# Patient Record
Sex: MALE | Race: WHITE
[De-identification: names, ages, dates, MRNs, and addresses within clinical notes are randomized per-mention and may not be internally consistent; named-entity substitution may affect disease eponyms.]

---

## 2019-11-12 ENCOUNTER — HOSPITAL ENCOUNTER (EMERGENCY)
Dept: HOSPITAL 94 - ER | Age: 39
Discharge: HOME | End: 2019-11-12
Payer: MEDICAID

## 2019-11-12 VITALS — WEIGHT: 167.55 LBS | HEIGHT: 74 IN | BODY MASS INDEX: 21.5 KG/M2

## 2019-11-12 VITALS — DIASTOLIC BLOOD PRESSURE: 97 MMHG | SYSTOLIC BLOOD PRESSURE: 158 MMHG

## 2019-11-12 DIAGNOSIS — H57.12: Primary | ICD-10-CM

## 2019-11-12 DIAGNOSIS — Z79.899: ICD-10-CM

## 2019-11-12 PROCEDURE — 99283 EMERGENCY DEPT VISIT LOW MDM: CPT

## 2020-07-16 ENCOUNTER — HOSPITAL ENCOUNTER (INPATIENT)
Dept: HOSPITAL 94 - ER | Age: 40
LOS: 19 days | DRG: 720 | End: 2020-08-04
Attending: INTERNAL MEDICINE | Admitting: INTERNAL MEDICINE
Payer: MEDICAID

## 2020-07-16 VITALS — SYSTOLIC BLOOD PRESSURE: 115 MMHG | DIASTOLIC BLOOD PRESSURE: 65 MMHG

## 2020-07-16 VITALS — DIASTOLIC BLOOD PRESSURE: 80 MMHG | SYSTOLIC BLOOD PRESSURE: 147 MMHG

## 2020-07-16 VITALS — BODY MASS INDEX: 26.09 KG/M2 | HEIGHT: 73 IN | WEIGHT: 196.87 LBS

## 2020-07-16 VITALS — SYSTOLIC BLOOD PRESSURE: 102 MMHG | DIASTOLIC BLOOD PRESSURE: 48 MMHG

## 2020-07-16 DIAGNOSIS — A41.9: Primary | ICD-10-CM

## 2020-07-16 DIAGNOSIS — R64: ICD-10-CM

## 2020-07-16 DIAGNOSIS — E87.2: ICD-10-CM

## 2020-07-16 DIAGNOSIS — K29.80: ICD-10-CM

## 2020-07-16 DIAGNOSIS — F15.129: ICD-10-CM

## 2020-07-16 DIAGNOSIS — Z20.828: ICD-10-CM

## 2020-07-16 DIAGNOSIS — Z66: ICD-10-CM

## 2020-07-16 DIAGNOSIS — T88.4XXA: ICD-10-CM
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
## 2020-07-16 DIAGNOSIS — R57.0: ICD-10-CM

## 2020-07-16 DIAGNOSIS — K55.069: ICD-10-CM

## 2020-07-16 DIAGNOSIS — K29.70: ICD-10-CM

## 2020-07-16 DIAGNOSIS — M62.82: ICD-10-CM

## 2020-07-16 DIAGNOSIS — R65.21: ICD-10-CM

## 2020-07-16 DIAGNOSIS — G93.40: ICD-10-CM

## 2020-07-16 DIAGNOSIS — E86.0: ICD-10-CM

## 2020-07-16 DIAGNOSIS — E87.5: ICD-10-CM

## 2020-07-16 DIAGNOSIS — J96.01: ICD-10-CM

## 2020-07-16 DIAGNOSIS — N17.9: ICD-10-CM

## 2020-07-16 DIAGNOSIS — I21.4: ICD-10-CM

## 2020-07-16 LAB
ALBUMIN SERPL BCP-MCNC: 3.3 G/DL (ref 3.4–5)
ALBUMIN SERPL BCP-MCNC: 4.3 G/DL (ref 3.4–5)
ALBUMIN/GLOB SERPL: 1 {RATIO} (ref 1.1–1.5)
ALP SERPL-CCNC: 112 IU/L (ref 46–116)
ALT SERPL W P-5'-P-CCNC: 22 U/L (ref 12–78)
AMORPH URATE CRY #/AREA URNS HPF: (no result) /[HPF]
AMPHETAMINES UR QL SCN: POSITIVE
AMYLASE SERPL-CCNC: 407 U/L (ref 25–115)
ANION GAP SERPL CALCULATED.3IONS-SCNC: 13 MMOL/L (ref 8–16)
ANION GAP SERPL CALCULATED.3IONS-SCNC: 13 MMOL/L (ref 8–16)
APTT PPP: 40 SECONDS (ref 22–32)
ARTERIAL PATENCY WRIST A: POSITIVE
AST SERPL W P-5'-P-CCNC: 56 U/L (ref 10–37)
BACTERIA URNS QL MICRO: (no result) /HPF
BARBITURATES UR QL SCN: NEGATIVE
BASE EXCESS BLDA CALC-SCNC: -9 MMOL/L (ref -2–2)
BASOPHILS # BLD AUTO: 0 X10'3 (ref 0–0.2)
BASOPHILS # BLD AUTO: 0 X10'3 (ref 0–0.2)
BASOPHILS NFR BLD AUTO: 0.1 % (ref 0–1)
BASOPHILS NFR BLD AUTO: 0.5 % (ref 0–1)
BENZODIAZ UR QL SCN: NEGATIVE
BILIRUB SERPL-MCNC: 0.3 MG/DL (ref 0.1–1)
BODY TEMPERATURE: 41.2
BUN SERPL-MCNC: 17 MG/DL (ref 7–18)
BUN SERPL-MCNC: 28 MG/DL (ref 7–18)
BUN/CREAT SERPL: 8.6 (ref 5.4–32)
BUN/CREAT SERPL: 9.7 (ref 5.4–32)
BZE UR QL SCN: NEGATIVE
CALCIUM SERPL-MCNC: 7.7 MG/DL (ref 8.5–10.1)
CALCIUM SERPL-MCNC: 9.4 MG/DL (ref 8.5–10.1)
CANNABINOIDS UR QL SCN: POSITIVE
CHLORIDE SERPL-SCNC: 107 MMOL/L (ref 99–107)
CHLORIDE SERPL-SCNC: 109 MMOL/L (ref 99–107)
CK SERPL-CCNC: 6927 U/L (ref 39–308)
CLARITY UR: (no result)
CO2 SERPL-SCNC: 19.4 MMOL/L (ref 24–32)
CO2 SERPL-SCNC: 22.6 MMOL/L (ref 24–32)
COHGB MFR BLDA: 0.5 % (ref 0–3.9)
COLOR UR: YELLOW
CREAT SERPL-MCNC: 1.98 MG/DL (ref 0.6–1.1)
CREAT SERPL-MCNC: 2.89 MG/DL (ref 0.6–1.1)
DEPRECATED SQUAMOUS URNS QL MICRO: (no result) /LPF
EOSINOPHIL # BLD AUTO: 0 X10'3 (ref 0–0.9)
EOSINOPHIL # BLD AUTO: 0.1 X10'3 (ref 0–0.9)
EOSINOPHIL NFR BLD AUTO: 0.1 % (ref 0–6)
EOSINOPHIL NFR BLD AUTO: 1.1 % (ref 0–6)
EOSINOPHIL NFR BLD MANUAL: 1 % (ref 0–6)
ERYTHROCYTE [DISTWIDTH] IN BLOOD BY AUTOMATED COUNT: 13.7 % (ref 11.5–14.5)
ERYTHROCYTE [DISTWIDTH] IN BLOOD BY AUTOMATED COUNT: 14 % (ref 11.5–14.5)
ETHANOL SERPL-MCNC: < 0.01 GM/DL (ref 0–0.01)
GFR SERPL CREATININE-BSD FRML MDRD: 24 ML/MIN
GFR SERPL CREATININE-BSD FRML MDRD: 38 ML/MIN
GLUCOSE SERPL-MCNC: 121 MG/DL (ref 70–104)
GLUCOSE SERPL-MCNC: 80 MG/DL (ref 70–104)
GLUCOSE UR STRIP-MCNC: NEGATIVE MG/DL
HCO3 BLDA-SCNC: 17.4 MMOL/L (ref 22–26)
HCT VFR BLD AUTO: 44.3 % (ref 42–52)
HCT VFR BLD AUTO: 47.4 % (ref 42–52)
HGB BLD-MCNC: 14.4 G/DL (ref 14–17.9)
HGB BLD-MCNC: 16.3 G/DL (ref 14–17.9)
HGB BLDA-MCNC: 14.8 G/DL (ref 14–18)
HGB UR QL STRIP: NEGATIVE
KETONES UR STRIP-MCNC: NEGATIVE MG/DL
LDLC SERPL DIRECT ASSAY-MCNC: 134 MG/DL (ref 50–100)
LEUKOCYTE ESTERASE UR QL STRIP: NEGATIVE
LIPASE SERPL-CCNC: 6482 U/L (ref 73–393)
LYMPHOCYTES # BLD AUTO: 0.9 X10'3 (ref 1.1–4.8)
LYMPHOCYTES # BLD AUTO: 2.9 X10'3 (ref 1.1–4.8)
LYMPHOCYTES NFR BLD AUTO: 58 % (ref 21–51)
LYMPHOCYTES NFR BLD AUTO: 6 % (ref 21–51)
LYMPHOCYTES NFR BLD MANUAL: 51 % (ref 21–51)
MCH RBC QN AUTO: 30.3 PG (ref 27–31)
MCH RBC QN AUTO: 31.6 PG (ref 27–31)
MCHC RBC AUTO-ENTMCNC: 32.6 G/DL (ref 33–36.5)
MCHC RBC AUTO-ENTMCNC: 34.3 G/DL (ref 33–36.5)
MCV RBC AUTO: 91.9 FL (ref 78–98)
MCV RBC AUTO: 92.9 FL (ref 78–98)
METAMYELOCYTES NFR BLD MANUAL: 1 % (ref 0–0)
METHADONE UR QL SCN: NEGATIVE
METHGB MFR BLDA: 0.4 % (ref 0–1.5)
MONOCYTES # BLD AUTO: 0.1 X10'3 (ref 0–0.9)
MONOCYTES # BLD AUTO: 1.2 X10'3 (ref 0–0.9)
MONOCYTES NFR BLD AUTO: 2.3 % (ref 2–12)
MONOCYTES NFR BLD AUTO: 8.4 % (ref 2–12)
MONOCYTES NFR BLD MANUAL: 7 % (ref 2–12)
MUCOUS THREADS URNS QL MICRO: (no result) /LPF
NEUTROPHILS # BLD AUTO: 1.9 X10'3 (ref 1.8–7.7)
NEUTROPHILS # BLD AUTO: 12.5 X10'3 (ref 1.8–7.7)
NEUTROPHILS NFR BLD AUTO: 38.1 % (ref 42–75)
NEUTROPHILS NFR BLD AUTO: 85.4 % (ref 42–75)
NEUTS HYPERSEG BLD QL SMEAR: (no result)
NEUTS SEG NFR BLD MANUAL: 34 % (ref 42–75)
NITRITE UR QL STRIP: NEGATIVE
O2/TOTAL GAS SETTING VFR VENT: 100 MMHG/%
OPIATES UR QL SCN: NEGATIVE
OXYHGB MFR BLDA: 98.7 % (ref 94–97)
PCO2 TEMP ADJ BLDA: 47.6 MMHG (ref 35–48)
PCP UR QL SCN: NEGATIVE
PEEP RESPIRATORY: 5 CM H2O
PH UR STRIP: 6.5 [PH] (ref 4.8–8)
PLATELET # BLD AUTO: 154 X10'3 (ref 140–440)
PLATELET # BLD AUTO: 304 X10'3 (ref 140–440)
PLATELET BLD QL SMEAR: NORMAL
PMV BLD AUTO: 7.2 FL (ref 7.4–10.4)
PMV BLD AUTO: 7.3 FL (ref 7.4–10.4)
PO2 TEMP ADJ BLD: 528 MMHG (ref 75–100)
POTASSIUM SERPL-SCNC: 4.3 MMOL/L (ref 3.5–5.1)
POTASSIUM SERPL-SCNC: 5.9 MMOL/L (ref 3.5–5.1)
PROT SERPL-MCNC: 8.7 G/DL (ref 6.4–8.2)
PROT UR QL STRIP: 30 MG/DL
RBC # BLD AUTO: 4.77 X10'6 (ref 4.7–6.1)
RBC # BLD AUTO: 5.16 X10'6 (ref 4.7–6.1)
RBC #/AREA URNS HPF: (no result) /HPF (ref 0–2)
RBC MORPH BLD: NORMAL
RESP RATE 1H: 18 B/MIN
SAO2 % BLDA: 99.6 % (ref 94–97)
SMUDGE CELLS BLD QL SMEAR: (no result)
SODIUM SERPL-SCNC: 141 MMOL/L (ref 135–145)
SODIUM SERPL-SCNC: 143 MMOL/L (ref 135–145)
SP GR UR STRIP: >=1.03 (ref 1–1.03)
SPONT VT COMPRES GAS VOL SET UNCOR VENT: 450 ML
TOTAL CELLS COUNTED FLD: 100
TRANS CELLS URNS QL MICRO: (no result) /HPF
TROPONIN I SERPL-MCNC: 0.31 NG/ML (ref 0–0.05)
URN COLLECT METHOD CLASS: (no result)
UROBILINOGEN UR STRIP-MCNC: 0.2 E.U/DL (ref 0.2–1)
VARIANT LYMPHS NFR BLD MANUAL: 6 % (ref 0–0)
WBC # BLD AUTO: 14.7 X10'3 (ref 4.5–11)
WBC # BLD AUTO: 5 X10'3 (ref 4.5–11)
WBC #/AREA URNS HPF: (no result) /HPF (ref 0–4)
WBC CLUMPS #/AREA URNS HPF: (no result) /HPF

## 2020-07-16 PROCEDURE — 70450 CT HEAD/BRAIN W/O DYE: CPT

## 2020-07-16 PROCEDURE — 87635 SARS-COV-2 COVID-19 AMP PRB: CPT

## 2020-07-16 PROCEDURE — 94760 N-INVAS EAR/PLS OXIMETRY 1: CPT

## 2020-07-16 PROCEDURE — 85025 COMPLETE CBC W/AUTO DIFF WBC: CPT

## 2020-07-16 PROCEDURE — 71250 CT THORAX DX C-: CPT

## 2020-07-16 PROCEDURE — 80305 DRUG TEST PRSMV DIR OPT OBS: CPT

## 2020-07-16 PROCEDURE — 85379 FIBRIN DEGRADATION QUANT: CPT

## 2020-07-16 PROCEDURE — 74018 RADEX ABDOMEN 1 VIEW: CPT

## 2020-07-16 PROCEDURE — 87186 SC STD MICRODIL/AGAR DIL: CPT

## 2020-07-16 PROCEDURE — 82150 ASSAY OF AMYLASE: CPT

## 2020-07-16 PROCEDURE — 86920 COMPATIBILITY TEST SPIN: CPT

## 2020-07-16 PROCEDURE — 82810 BLOOD GASES O2 SAT ONLY: CPT

## 2020-07-16 PROCEDURE — 95816 EEG AWAKE AND DROWSY: CPT

## 2020-07-16 PROCEDURE — 85018 HEMOGLOBIN: CPT

## 2020-07-16 PROCEDURE — 87070 CULTURE OTHR SPECIMN AEROBIC: CPT

## 2020-07-16 PROCEDURE — 77001 FLUOROGUIDE FOR VEIN DEVICE: CPT

## 2020-07-16 PROCEDURE — 82140 ASSAY OF AMMONIA: CPT

## 2020-07-16 PROCEDURE — 99291 CRITICAL CARE FIRST HOUR: CPT

## 2020-07-16 PROCEDURE — 82550 ASSAY OF CK (CPK): CPT

## 2020-07-16 PROCEDURE — 87077 CULTURE AEROBIC IDENTIFY: CPT

## 2020-07-16 PROCEDURE — 36600 WITHDRAWAL OF ARTERIAL BLOOD: CPT

## 2020-07-16 PROCEDURE — 87088 URINE BACTERIA CULTURE: CPT

## 2020-07-16 PROCEDURE — 84100 ASSAY OF PHOSPHORUS: CPT

## 2020-07-16 PROCEDURE — 83721 ASSAY OF BLOOD LIPOPROTEIN: CPT

## 2020-07-16 PROCEDURE — 83690 ASSAY OF LIPASE: CPT

## 2020-07-16 PROCEDURE — 93005 ELECTROCARDIOGRAM TRACING: CPT

## 2020-07-16 PROCEDURE — 36558 INSERT TUNNELED CV CATH: CPT

## 2020-07-16 PROCEDURE — 80048 BASIC METABOLIC PNL TOTAL CA: CPT

## 2020-07-16 PROCEDURE — 36430 TRANSFUSION BLD/BLD COMPNT: CPT

## 2020-07-16 PROCEDURE — 0BH17EZ INSERTION OF ENDOTRACHEAL AIRWAY INTO TRACHEA, VIA NATURAL OR ARTIFICIAL OPENING: ICD-10-PCS | Performed by: INTERNAL MEDICINE

## 2020-07-16 PROCEDURE — 81001 URINALYSIS AUTO W/SCOPE: CPT

## 2020-07-16 PROCEDURE — 87081 CULTURE SCREEN ONLY: CPT

## 2020-07-16 PROCEDURE — 76937 US GUIDE VASCULAR ACCESS: CPT

## 2020-07-16 PROCEDURE — 94002 VENT MGMT INPAT INIT DAY: CPT

## 2020-07-16 PROCEDURE — 70551 MRI BRAIN STEM W/O DYE: CPT

## 2020-07-16 PROCEDURE — 86900 BLOOD TYPING SEROLOGIC ABO: CPT

## 2020-07-16 PROCEDURE — 82948 REAGENT STRIP/BLOOD GLUCOSE: CPT

## 2020-07-16 PROCEDURE — 83605 ASSAY OF LACTIC ACID: CPT

## 2020-07-16 PROCEDURE — 5A1955Z RESPIRATORY VENTILATION, GREATER THAN 96 CONSECUTIVE HOURS: ICD-10-PCS | Performed by: INTERNAL MEDICINE

## 2020-07-16 PROCEDURE — 83874 ASSAY OF MYOGLOBIN: CPT

## 2020-07-16 PROCEDURE — 85027 COMPLETE CBC AUTOMATED: CPT

## 2020-07-16 PROCEDURE — 84134 ASSAY OF PREALBUMIN: CPT

## 2020-07-16 PROCEDURE — 85730 THROMBOPLASTIN TIME PARTIAL: CPT

## 2020-07-16 PROCEDURE — 80053 COMPREHEN METABOLIC PANEL: CPT

## 2020-07-16 PROCEDURE — 84484 ASSAY OF TROPONIN QUANT: CPT

## 2020-07-16 PROCEDURE — 36415 COLL VENOUS BLD VENIPUNCTURE: CPT

## 2020-07-16 PROCEDURE — 74176 CT ABD & PELVIS W/O CONTRAST: CPT

## 2020-07-16 PROCEDURE — 85610 PROTHROMBIN TIME: CPT

## 2020-07-16 PROCEDURE — 99292 CRITICAL CARE ADDL 30 MIN: CPT

## 2020-07-16 PROCEDURE — 85384 FIBRINOGEN ACTIVITY: CPT

## 2020-07-16 PROCEDURE — 86885 COOMBS TEST INDIRECT QUAL: CPT

## 2020-07-16 PROCEDURE — 87340 HEPATITIS B SURFACE AG IA: CPT

## 2020-07-16 PROCEDURE — 84145 PROCALCITONIN (PCT): CPT

## 2020-07-16 PROCEDURE — 82803 BLOOD GASES ANY COMBINATION: CPT

## 2020-07-16 PROCEDURE — 94003 VENT MGMT INPAT SUBQ DAY: CPT

## 2020-07-16 PROCEDURE — 80320 DRUG SCREEN QUANTALCOHOLS: CPT

## 2020-07-16 PROCEDURE — 43235 EGD DIAGNOSTIC BRUSH WASH: CPT

## 2020-07-16 PROCEDURE — 92508 TX SP LANG VOICE COMM GROUP: CPT

## 2020-07-16 PROCEDURE — 80202 ASSAY OF VANCOMYCIN: CPT

## 2020-07-16 PROCEDURE — 94640 AIRWAY INHALATION TREATMENT: CPT

## 2020-07-16 PROCEDURE — 83735 ASSAY OF MAGNESIUM: CPT

## 2020-07-16 PROCEDURE — 84443 ASSAY THYROID STIM HORMONE: CPT

## 2020-07-16 PROCEDURE — 87040 BLOOD CULTURE FOR BACTERIA: CPT

## 2020-07-16 PROCEDURE — 93306 TTE W/DOPPLER COMPLETE: CPT

## 2020-07-16 PROCEDURE — 71045 X-RAY EXAM CHEST 1 VIEW: CPT

## 2020-07-16 PROCEDURE — 86901 BLOOD TYPING SEROLOGIC RH(D): CPT

## 2020-07-16 RX ADMIN — HEPARIN SODIUM SCH UNIT: 5000 INJECTION, SOLUTION INTRAVENOUS; SUBCUTANEOUS at 20:00

## 2020-07-16 RX ADMIN — VANCOMYCIN HYDROCHLORIDE SCH MLS/HR: 750 INJECTION, POWDER, LYOPHILIZED, FOR SOLUTION INTRAVENOUS at 20:31

## 2020-07-16 RX ADMIN — Medication PRN MLS/HR: at 18:33

## 2020-07-16 RX ADMIN — Medication PRN MLS/HR: at 18:37

## 2020-07-16 RX ADMIN — Medication SCH MLS/HR: at 17:52

## 2020-07-16 NOTE — NUR
[place] @ 2045 [place] and transferred to ICU bed and placed on bedside 
monitor. PT is afebrile with temp of 36.8 after being cooled in ED and given Dantrolene. PT 
has R-IJ that is oozing, pressure drsg placed. Sanders in place draining magaly/zuleima colored 
urine to gravity. PT intubated and tolerating settings, O2 sat > 97%. PT has abrasion to LT 
shoulder and hip as well as a contusion to LT lateral chest, Bilat breath sounds auscultated 
and no crepitus noted. PT also has a spot on his front upper gums that appears to be 
bleeding.  Bed is locked and low. Bilat soft wrist restraints in place ans secure. Will 
continue to monitor.

## 2020-07-16 NOTE — NUR
KAYEXELATE ORDERED PO, PT OG TUBE IS CONTINUALLY SUCTIONING SEROSANGINOUS 
FLUID. ANA AWARE AND ORDERED KAYEXELATE RECTALLY

## 2020-07-16 NOTE — NUR
NOTED GASTRIC CONTENTS THROUGH NGT IN SUCTION CANISTER @150ML OF RED WINE COLOR 
RETURN. MD NOTIFIED. ORDERS TO COLD SALINE LAVAGE THROUGH NGT.

## 2020-07-16 NOTE — NUR
Pt transported to CT scan via gurney with the primary nurse, respiratory 
therapist and Radiology transport tech.

## 2020-07-16 NOTE — NUR
I have received report from Ruthie MAGAÑA from ED and had the opportunity to ask questions. Room 
is set up and ready for PT arrival.

## 2020-07-16 NOTE — NUR
PT AROUSED AND HEAVING AND BLOODY FLUIDS IN MOUTH. PT MOUTH SUCTIONED AND NG 
TUBE PLACED ON LOW INTERMITTENT SUCTION. 75 ML OF BRIGHT RED FLUIDS OUT OF NG.

## 2020-07-16 NOTE — NUR
1656-20 ETOMADATE, 1700-100 ROCC, 8.0 ETT, 26 AT THE TEETH. VENT SETTINGS TV 
450, 100%, RATE 18, PEEP 5. LEVOPHED STARTED AT 1715 AT 14.51ML/HR

## 2020-07-17 VITALS — DIASTOLIC BLOOD PRESSURE: 54 MMHG | SYSTOLIC BLOOD PRESSURE: 92 MMHG

## 2020-07-17 VITALS — SYSTOLIC BLOOD PRESSURE: 99 MMHG | DIASTOLIC BLOOD PRESSURE: 63 MMHG

## 2020-07-17 VITALS — SYSTOLIC BLOOD PRESSURE: 86 MMHG | DIASTOLIC BLOOD PRESSURE: 45 MMHG

## 2020-07-17 VITALS — DIASTOLIC BLOOD PRESSURE: 57 MMHG | SYSTOLIC BLOOD PRESSURE: 92 MMHG

## 2020-07-17 VITALS — DIASTOLIC BLOOD PRESSURE: 67 MMHG | SYSTOLIC BLOOD PRESSURE: 101 MMHG

## 2020-07-17 VITALS — DIASTOLIC BLOOD PRESSURE: 47 MMHG | SYSTOLIC BLOOD PRESSURE: 88 MMHG

## 2020-07-17 VITALS — SYSTOLIC BLOOD PRESSURE: 86 MMHG | DIASTOLIC BLOOD PRESSURE: 49 MMHG

## 2020-07-17 VITALS — DIASTOLIC BLOOD PRESSURE: 64 MMHG | SYSTOLIC BLOOD PRESSURE: 102 MMHG

## 2020-07-17 VITALS — DIASTOLIC BLOOD PRESSURE: 55 MMHG | SYSTOLIC BLOOD PRESSURE: 87 MMHG

## 2020-07-17 VITALS — SYSTOLIC BLOOD PRESSURE: 93 MMHG | DIASTOLIC BLOOD PRESSURE: 58 MMHG

## 2020-07-17 VITALS — DIASTOLIC BLOOD PRESSURE: 65 MMHG | SYSTOLIC BLOOD PRESSURE: 105 MMHG

## 2020-07-17 VITALS — SYSTOLIC BLOOD PRESSURE: 98 MMHG | DIASTOLIC BLOOD PRESSURE: 59 MMHG

## 2020-07-17 VITALS — DIASTOLIC BLOOD PRESSURE: 58 MMHG | SYSTOLIC BLOOD PRESSURE: 109 MMHG

## 2020-07-17 VITALS — SYSTOLIC BLOOD PRESSURE: 98 MMHG | DIASTOLIC BLOOD PRESSURE: 55 MMHG

## 2020-07-17 VITALS — DIASTOLIC BLOOD PRESSURE: 56 MMHG | SYSTOLIC BLOOD PRESSURE: 94 MMHG

## 2020-07-17 VITALS — SYSTOLIC BLOOD PRESSURE: 81 MMHG | DIASTOLIC BLOOD PRESSURE: 51 MMHG

## 2020-07-17 VITALS — SYSTOLIC BLOOD PRESSURE: 85 MMHG | DIASTOLIC BLOOD PRESSURE: 56 MMHG

## 2020-07-17 VITALS — SYSTOLIC BLOOD PRESSURE: 104 MMHG | DIASTOLIC BLOOD PRESSURE: 63 MMHG

## 2020-07-17 VITALS — SYSTOLIC BLOOD PRESSURE: 80 MMHG | DIASTOLIC BLOOD PRESSURE: 47 MMHG

## 2020-07-17 VITALS — SYSTOLIC BLOOD PRESSURE: 94 MMHG | DIASTOLIC BLOOD PRESSURE: 53 MMHG

## 2020-07-17 VITALS — DIASTOLIC BLOOD PRESSURE: 64 MMHG | SYSTOLIC BLOOD PRESSURE: 101 MMHG

## 2020-07-17 VITALS — DIASTOLIC BLOOD PRESSURE: 61 MMHG | SYSTOLIC BLOOD PRESSURE: 103 MMHG

## 2020-07-17 VITALS — DIASTOLIC BLOOD PRESSURE: 65 MMHG | SYSTOLIC BLOOD PRESSURE: 102 MMHG

## 2020-07-17 LAB
ALBUMIN SERPL BCP-MCNC: 3.1 G/DL (ref 3.4–5)
ALBUMIN/GLOB SERPL: 1 {RATIO} (ref 1.1–1.5)
ALP SERPL-CCNC: 118 IU/L (ref 46–116)
ALT SERPL W P-5'-P-CCNC: 168 U/L (ref 12–78)
AMYLASE SERPL-CCNC: 638 U/L (ref 25–115)
ANION GAP SERPL CALCULATED.3IONS-SCNC: 13 MMOL/L (ref 8–16)
APTT PPP: 87 SECONDS (ref 22–32)
APTT PPP: 89 SECONDS (ref 22–32)
ARTERIAL PATENCY WRIST A: POSITIVE
AST SERPL W P-5'-P-CCNC: 803 U/L (ref 10–37)
BASE EXCESS BLDA CALC-SCNC: -11.4 MMOL/L (ref -2–2)
BASOPHILS # BLD AUTO: 0 X10'3 (ref 0–0.2)
BASOPHILS NFR BLD AUTO: 0.1 % (ref 0–1)
BILIRUB SERPL-MCNC: 0.5 MG/DL (ref 0.1–1)
BODY TEMPERATURE: 37.7
BUN SERPL-MCNC: 34 MG/DL (ref 7–18)
BUN/CREAT SERPL: 9.8 (ref 5.4–32)
CALCIUM SERPL-MCNC: 8.1 MG/DL (ref 8.5–10.1)
CHLORIDE SERPL-SCNC: 108 MMOL/L (ref 99–107)
CK SERPL-CCNC: (no result) U/L (ref 39–308)
CO2 SERPL-SCNC: 20.2 MMOL/L (ref 24–32)
COHGB MFR BLDA: 0.3 % (ref 0–3.9)
CREAT SERPL-MCNC: 3.46 MG/DL (ref 0.6–1.1)
EOSINOPHIL # BLD AUTO: 0 X10'3 (ref 0–0.9)
EOSINOPHIL NFR BLD AUTO: 0.1 % (ref 0–6)
ERYTHROCYTE [DISTWIDTH] IN BLOOD BY AUTOMATED COUNT: 13.9 % (ref 11.5–14.5)
GFR SERPL CREATININE-BSD FRML MDRD: 20 ML/MIN
GLUCOSE SERPL-MCNC: 107 MG/DL (ref 70–104)
HCO3 BLDA-SCNC: 14.2 MMOL/L (ref 22–26)
HCT VFR BLD AUTO: 44.1 % (ref 42–52)
HGB BLD-MCNC: 14.5 G/DL (ref 14–17.9)
HGB BLDA-MCNC: 15.1 G/DL (ref 14–18)
LDLC SERPL DIRECT ASSAY-MCNC: 116 MG/DL (ref 50–100)
LIPASE SERPL-CCNC: 6036 U/L (ref 73–393)
LYMPHOCYTES # BLD AUTO: 0.4 X10'3 (ref 1.1–4.8)
LYMPHOCYTES NFR BLD AUTO: 4 % (ref 21–51)
MAGNESIUM SERPL-MCNC: 2 MG/DL (ref 1.5–2.4)
MAGNESIUM SERPL-MCNC: 2.3 MG/DL (ref 1.5–2.4)
MCH RBC QN AUTO: 30.9 PG (ref 27–31)
MCHC RBC AUTO-ENTMCNC: 32.9 G/DL (ref 33–36.5)
MCV RBC AUTO: 93.9 FL (ref 78–98)
METHGB MFR BLDA: 0.2 % (ref 0–1.5)
MONOCYTES # BLD AUTO: 0.8 X10'3 (ref 0–0.9)
MONOCYTES NFR BLD AUTO: 6.9 % (ref 2–12)
NEUTROPHILS # BLD AUTO: 9.8 X10'3 (ref 1.8–7.7)
NEUTROPHILS NFR BLD AUTO: 88.9 % (ref 42–75)
O2/TOTAL GAS SETTING VFR VENT: 35 MMHG/%
OXYHGB MFR BLDA: 98.2 % (ref 94–97)
PCO2 TEMP ADJ BLDA: 32.8 MMHG (ref 35–48)
PEEP RESPIRATORY: 5 CM H2O
PHOSPHATE SERPL-MCNC: 0.9 MG/DL (ref 2.3–4.5)
PHOSPHATE SERPL-MCNC: 1.6 MG/DL (ref 2.3–4.5)
PLATELET # BLD AUTO: 136 X10'3 (ref 140–440)
PMV BLD AUTO: 7 FL (ref 7.4–10.4)
PO2 TEMP ADJ BLD: 147.9 MMHG (ref 75–100)
PO2 TEMP ADJ BLDMV: 45.8 MMHG (ref 35–46)
POTASSIUM SERPL-SCNC: 3.6 MMOL/L (ref 3.5–5.1)
PROT SERPL-MCNC: 6.1 G/DL (ref 6.4–8.2)
RBC # BLD AUTO: 4.7 X10'6 (ref 4.7–6.1)
RESP RATE 1H: 18 B/MIN
SAO2 % BLDA: 98.7 % (ref 94–97)
SAO2 % BLDMV: 78.1 % (ref 60–80)
SODIUM SERPL-SCNC: 141 MMOL/L (ref 135–145)
SPONT VT COMPRES GAS VOL SET UNCOR VENT: 450 ML
TROPONIN I SERPL-MCNC: 9.48 NG/ML (ref 0–0.05)
WBC # BLD AUTO: 11 X10'3 (ref 4.5–11)

## 2020-07-17 RX ADMIN — ACETAMINOPHEN PRN MG: 160 SUSPENSION ORAL at 10:38

## 2020-07-17 RX ADMIN — Medication PRN MLS/HR: at 05:57

## 2020-07-17 RX ADMIN — HEPARIN SODIUM SCH UNIT: 5000 INJECTION, SOLUTION INTRAVENOUS; SUBCUTANEOUS at 09:17

## 2020-07-17 RX ADMIN — DEXTROSE MONOHYDRATE PRN ML: 25 INJECTION, SOLUTION INTRAVENOUS at 20:30

## 2020-07-17 RX ADMIN — DEXTROSE SCH MLS/HR: 5 SOLUTION INTRAVENOUS at 17:33

## 2020-07-17 RX ADMIN — Medication PRN MLS/HR: at 09:36

## 2020-07-17 RX ADMIN — Medication SCH MLS/HR: at 17:33

## 2020-07-17 RX ADMIN — DEXTROSE MONOHYDRATE PRN ML: 25 INJECTION, SOLUTION INTRAVENOUS at 22:16

## 2020-07-17 RX ADMIN — DEXTROSE SCH MLS/HR: 5 SOLUTION INTRAVENOUS at 08:58

## 2020-07-17 RX ADMIN — Medication PRN MLS/HR: at 20:02

## 2020-07-17 RX ADMIN — TAZOBACTAM SODIUM AND PIPERACILLIN SODIUM SCH MLS/HR: 375; 3 INJECTION, SOLUTION INTRAVENOUS at 15:43

## 2020-07-17 RX ADMIN — TAZOBACTAM SODIUM AND PIPERACILLIN SODIUM SCH MLS/HR: 375; 3 INJECTION, SOLUTION INTRAVENOUS at 00:54

## 2020-07-17 RX ADMIN — TAZOBACTAM SODIUM AND PIPERACILLIN SODIUM SCH MLS/HR: 375; 3 INJECTION, SOLUTION INTRAVENOUS at 08:58

## 2020-07-17 RX ADMIN — Medication SCH MLS/HR: at 04:56

## 2020-07-17 RX ADMIN — VANCOMYCIN HYDROCHLORIDE SCH MLS/HR: 750 INJECTION, POWDER, LYOPHILIZED, FOR SOLUTION INTRAVENOUS at 07:50

## 2020-07-17 NOTE — NUR
I have reviewed and agree with all medications administered and interventions performed by 
[facility] Julio C Hassan

-------------------------------------------------------------------------------

Addendum: 07/17/20 at 1249 by Katie Carranza RT

-------------------------------------------------------------------------------

Amended: Links added.

## 2020-07-17 NOTE — NUR
Tube feeding consult received. Pt intubated, sedated, on pressors. 

Admitted with acute respiratory failure, sepsis, KG all per MD note. MD 

discussed at critical care rounds that due to elevated pancreatic enzymes 

(lipase 6036 and amylase 638) will not start feedings today. Recs for tube 

feedings available however will hold for now. MURIEL discussed with MD to 

re-evaluate tomorrow since patient intubated, needing some type of 

nutrition tomorrow whether it be tube feeding or IV nutrition with TPN. 

Recommendations for both available in RD note. Pt MD note pt appears 

cachetic looking. 

Recommend:

1. IF tube feeding, recommend vital AF at 80 ml/hr will provide 1912 ml 

volume, 2304 cals, 144 gram protein, 1557 ml water. IF TF prealbumin 

monday and thursday.



2. IF TPN if unable to provide tube feeding would rec to meet patients 

needs 3:1 clinimix E 5/20 with 100 ml 20% intralipids at 90 ml/hr would 

provide 2160 ml total volume, 2015 total cals, 1604 non protein cals, 3.69 

mg/kg/min CHO loading. IF TPN, prealbumin and TG q monday and thursday. 



3. Daily weights. 

-------------------------------------------------------------------------------

Addendum: 07/17/20 at 1315 by Sherita Shirley RD

-------------------------------------------------------------------------------

Amended: Links added.

## 2020-07-17 NOTE — NUR
RICKI Chavez NP called re: Blood Glucose 69mg/dl via draw from central line. Orders for 
Hyper/hypoglycemic protocol given.

## 2020-07-17 NOTE — NUR
Problems reprioritized. Patient report given, questions answered & plan of care reviewed 
with Irene MAGAÑA.

## 2020-07-17 NOTE — NUR
Gave tylenol at 1040 for rising temperature of 38.5, temperature did not correct afterwards, 
actually zuleima to 38.6. Cooling blanket placed behind patient, Ventilator heater turned off, 
ice packs applied to groin, neck, and axilla. Patient temperature slowly reducing. Currently 
38.4.

## 2020-07-17 NOTE — NUR
NP Kathy called d/t receiving a critical PTT is 87, lab also stated that they were not 
able to result PT/INR. PT has been type and screened. Also in formed him of critical Phos of 
0.9, informed Kathy that PT does replacement orders but was instructed not to replace and 
that he would notify the MD on call. No new orders received at this time. Will continue to 
monitor.

## 2020-07-17 NOTE — NUR
Patient in room CICU 2013. I have received report from ARCHANA Bonilla and had the opportunity to 
ask questions and assume patient care.

## 2020-07-17 NOTE — NUR
HERON Chavez notified d/t receiving critical PTT of 87. Order received to type ans screen PT 
as well as repeat coags with AM labs. Will continue to monitor.

## 2020-07-18 VITALS — SYSTOLIC BLOOD PRESSURE: 112 MMHG | DIASTOLIC BLOOD PRESSURE: 59 MMHG

## 2020-07-18 VITALS — DIASTOLIC BLOOD PRESSURE: 51 MMHG | SYSTOLIC BLOOD PRESSURE: 97 MMHG

## 2020-07-18 VITALS — DIASTOLIC BLOOD PRESSURE: 66 MMHG | SYSTOLIC BLOOD PRESSURE: 97 MMHG

## 2020-07-18 VITALS — DIASTOLIC BLOOD PRESSURE: 44 MMHG | SYSTOLIC BLOOD PRESSURE: 89 MMHG

## 2020-07-18 VITALS — DIASTOLIC BLOOD PRESSURE: 55 MMHG | SYSTOLIC BLOOD PRESSURE: 101 MMHG

## 2020-07-18 VITALS — SYSTOLIC BLOOD PRESSURE: 95 MMHG | DIASTOLIC BLOOD PRESSURE: 59 MMHG

## 2020-07-18 VITALS — SYSTOLIC BLOOD PRESSURE: 81 MMHG | DIASTOLIC BLOOD PRESSURE: 49 MMHG

## 2020-07-18 VITALS — SYSTOLIC BLOOD PRESSURE: 87 MMHG | DIASTOLIC BLOOD PRESSURE: 42 MMHG

## 2020-07-18 VITALS — SYSTOLIC BLOOD PRESSURE: 99 MMHG | DIASTOLIC BLOOD PRESSURE: 64 MMHG

## 2020-07-18 VITALS — SYSTOLIC BLOOD PRESSURE: 96 MMHG | DIASTOLIC BLOOD PRESSURE: 58 MMHG

## 2020-07-18 VITALS — DIASTOLIC BLOOD PRESSURE: 55 MMHG | SYSTOLIC BLOOD PRESSURE: 92 MMHG

## 2020-07-18 VITALS — SYSTOLIC BLOOD PRESSURE: 108 MMHG | DIASTOLIC BLOOD PRESSURE: 66 MMHG

## 2020-07-18 VITALS — SYSTOLIC BLOOD PRESSURE: 104 MMHG | DIASTOLIC BLOOD PRESSURE: 52 MMHG

## 2020-07-18 VITALS — DIASTOLIC BLOOD PRESSURE: 68 MMHG | SYSTOLIC BLOOD PRESSURE: 108 MMHG

## 2020-07-18 VITALS — DIASTOLIC BLOOD PRESSURE: 72 MMHG | SYSTOLIC BLOOD PRESSURE: 113 MMHG

## 2020-07-18 VITALS — SYSTOLIC BLOOD PRESSURE: 94 MMHG | DIASTOLIC BLOOD PRESSURE: 61 MMHG

## 2020-07-18 VITALS — DIASTOLIC BLOOD PRESSURE: 56 MMHG | SYSTOLIC BLOOD PRESSURE: 107 MMHG

## 2020-07-18 VITALS — DIASTOLIC BLOOD PRESSURE: 56 MMHG | SYSTOLIC BLOOD PRESSURE: 105 MMHG

## 2020-07-18 VITALS — SYSTOLIC BLOOD PRESSURE: 87 MMHG | DIASTOLIC BLOOD PRESSURE: 51 MMHG

## 2020-07-18 VITALS — SYSTOLIC BLOOD PRESSURE: 107 MMHG | DIASTOLIC BLOOD PRESSURE: 49 MMHG

## 2020-07-18 VITALS — SYSTOLIC BLOOD PRESSURE: 88 MMHG | DIASTOLIC BLOOD PRESSURE: 50 MMHG

## 2020-07-18 VITALS — DIASTOLIC BLOOD PRESSURE: 56 MMHG | SYSTOLIC BLOOD PRESSURE: 90 MMHG

## 2020-07-18 VITALS — SYSTOLIC BLOOD PRESSURE: 120 MMHG | DIASTOLIC BLOOD PRESSURE: 62 MMHG

## 2020-07-18 LAB
ALBUMIN SERPL BCP-MCNC: 2.2 G/DL (ref 3.4–5)
ALBUMIN/GLOB SERPL: 0.9 {RATIO} (ref 1.1–1.5)
ALP SERPL-CCNC: 111 IU/L (ref 46–116)
ALT SERPL W P-5'-P-CCNC: 1139 U/L (ref 12–78)
AMYLASE SERPL-CCNC: 454 U/L (ref 25–115)
ANION GAP SERPL CALCULATED.3IONS-SCNC: 18 MMOL/L (ref 8–16)
ARTERIAL PATENCY WRIST A: POSITIVE
AST SERPL W P-5'-P-CCNC: 2972 U/L (ref 10–37)
BASE EXCESS BLDA CALC-SCNC: -7.6 MMOL/L (ref -2–2)
BASOPHILS # BLD AUTO: 0 X10'3 (ref 0–0.2)
BASOPHILS NFR BLD AUTO: 0.1 % (ref 0–1)
BILIRUB SERPL-MCNC: 1.8 MG/DL (ref 0.1–1)
BODY TEMPERATURE: 36.3
BUN SERPL-MCNC: 51 MG/DL (ref 7–18)
BUN/CREAT SERPL: 8.5 (ref 5.4–32)
CALCIUM SERPL-MCNC: 5.7 MG/DL (ref 8.5–10.1)
CHLORIDE SERPL-SCNC: 107 MMOL/L (ref 99–107)
CO2 SERPL-SCNC: 20.3 MMOL/L (ref 24–32)
COHGB MFR BLDA: 0.1 % (ref 0–3.9)
CREAT SERPL-MCNC: 6.01 MG/DL (ref 0.6–1.1)
EOSINOPHIL # BLD AUTO: 0 X10'3 (ref 0–0.9)
EOSINOPHIL NFR BLD AUTO: 0 % (ref 0–6)
ERYTHROCYTE [DISTWIDTH] IN BLOOD BY AUTOMATED COUNT: 14 % (ref 11.5–14.5)
ERYTHROCYTE [DISTWIDTH] IN BLOOD BY AUTOMATED COUNT: 14.3 % (ref 11.5–14.5)
GFR SERPL CREATININE-BSD FRML MDRD: 11 ML/MIN
GLUCOSE SERPL-MCNC: 99 MG/DL (ref 70–104)
HCO3 BLDA-SCNC: 18.8 MMOL/L (ref 22–26)
HCT VFR BLD AUTO: 40.9 % (ref 42–52)
HCT VFR BLD AUTO: 43 % (ref 42–52)
HGB BLD-MCNC: 13.5 G/DL (ref 14–17.9)
HGB BLD-MCNC: 14.1 G/DL (ref 14–17.9)
HGB BLDA-MCNC: 14.8 G/DL (ref 14–18)
LIPASE SERPL-CCNC: 2528 U/L (ref 73–393)
LYMPHOCYTES # BLD AUTO: 0.4 X10'3 (ref 1.1–4.8)
LYMPHOCYTES NFR BLD AUTO: 2.1 % (ref 21–51)
LYMPHOCYTES NFR BLD MANUAL: 2 % (ref 21–51)
MAGNESIUM SERPL-MCNC: 1.7 MG/DL (ref 1.5–2.4)
MCH RBC QN AUTO: 30.5 PG (ref 27–31)
MCH RBC QN AUTO: 30.7 PG (ref 27–31)
MCHC RBC AUTO-ENTMCNC: 32.8 G/DL (ref 33–36.5)
MCHC RBC AUTO-ENTMCNC: 33.1 G/DL (ref 33–36.5)
MCV RBC AUTO: 92.9 FL (ref 78–98)
MCV RBC AUTO: 93 FL (ref 78–98)
METHGB MFR BLDA: 0.2 % (ref 0–1.5)
MONOCYTES # BLD AUTO: 0.2 X10'3 (ref 0–0.9)
MONOCYTES NFR BLD AUTO: 0.8 % (ref 2–12)
MONOCYTES NFR BLD MANUAL: 2 % (ref 2–12)
NEUTROPHILS # BLD AUTO: 19.8 X10'3 (ref 1.8–7.7)
NEUTROPHILS NFR BLD AUTO: 97 % (ref 42–75)
NEUTS BAND # BLD MANUAL: 17 % (ref 0–10)
NEUTS SEG NFR BLD MANUAL: 79 % (ref 42–75)
O2/TOTAL GAS SETTING VFR VENT: 30 MMHG/%
OXYHGB MFR BLDA: 96.8 % (ref 94–97)
PCO2 TEMP ADJ BLDA: 40.1 MMHG (ref 35–48)
PEEP RESPIRATORY: 5 CM H2O
PHOSPHATE SERPL-MCNC: 7.2 MG/DL (ref 2.3–4.5)
PLATELET # BLD AUTO: 32 X10'3 (ref 140–440)
PLATELET # BLD AUTO: 33 X10'3 (ref 140–440)
PLATELET BLD QL SMEAR: (no result)
PMV BLD AUTO: 7.8 FL (ref 7.4–10.4)
PMV BLD AUTO: 9.9 FL (ref 7.4–10.4)
PO2 TEMP ADJ BLD: 88.1 MMHG (ref 75–100)
POTASSIUM SERPL-SCNC: 4.4 MMOL/L (ref 3.5–5.1)
PROT SERPL-MCNC: 4.6 G/DL (ref 6.4–8.2)
RBC # BLD AUTO: 4.4 X10'6 (ref 4.7–6.1)
RBC # BLD AUTO: 4.63 X10'6 (ref 4.7–6.1)
RBC MORPH BLD: NORMAL
RESP RATE 1H: 18 B/MIN
SAO2 % BLDA: 97.1 % (ref 94–97)
SODIUM SERPL-SCNC: 145 MMOL/L (ref 135–145)
SPONT VT COMPRES GAS VOL SET UNCOR VENT: 450 ML
TOTAL CELLS COUNTED FLD: 100
WBC # BLD AUTO: 19.3 X10'3 (ref 4.5–11)
WBC # BLD AUTO: 20.4 X10'3 (ref 4.5–11)

## 2020-07-18 PROCEDURE — 06HM33Z INSERTION OF INFUSION DEVICE INTO RIGHT FEMORAL VEIN, PERCUTANEOUS APPROACH: ICD-10-PCS | Performed by: INTERNAL MEDICINE

## 2020-07-18 PROCEDURE — 4A00X4Z MEASUREMENT OF CENTRAL NERVOUS ELECTRICAL ACTIVITY, EXTERNAL APPROACH: ICD-10-PCS | Performed by: INTERNAL MEDICINE

## 2020-07-18 PROCEDURE — 5A1D70Z PERFORMANCE OF URINARY FILTRATION, INTERMITTENT, LESS THAN 6 HOURS PER DAY: ICD-10-PCS | Performed by: INTERNAL MEDICINE

## 2020-07-18 RX ADMIN — DEXTROSE MONOHYDRATE PRN ML: 25 INJECTION, SOLUTION INTRAVENOUS at 00:35

## 2020-07-18 RX ADMIN — Medication SCH MLS/HR: at 19:42

## 2020-07-18 RX ADMIN — TAZOBACTAM SODIUM AND PIPERACILLIN SODIUM SCH MLS/HR: 375; 3 INJECTION, SOLUTION INTRAVENOUS at 00:28

## 2020-07-18 RX ADMIN — Medication SCH MMU: at 20:00

## 2020-07-18 RX ADMIN — MINERAL OIL, PETROLATUM PRN INCH: 425; 568 OINTMENT OPHTHALMIC at 22:40

## 2020-07-18 RX ADMIN — TAZOBACTAM SODIUM AND PIPERACILLIN SODIUM SCH MLS/HR: 375; 3 INJECTION, SOLUTION INTRAVENOUS at 07:03

## 2020-07-18 RX ADMIN — TAZOBACTAM SODIUM AND PIPERACILLIN SODIUM SCH MLS/HR: 375; 3 INJECTION, SOLUTION INTRAVENOUS at 20:33

## 2020-07-18 RX ADMIN — DEXTROSE SCH MLS/HR: 5 SOLUTION INTRAVENOUS at 03:03

## 2020-07-18 RX ADMIN — Medication SCH MLS/HR: at 01:11

## 2020-07-18 NOTE — NUR
Calcium 5.7. RICKI Chavez NP called. Order for 2 gram Calcium Chloride IVPB. Also made aware 
of increase in BUN and creatine with decreased urine output. No new orders at this time.

## 2020-07-18 NOTE — NUR
Sedation vacation from 0200 to current time. Patient remains unresponsive. Deep sedation. 
Throughout shift patient had multiple bowel movements of liquid stool, brown to dark red in 
color.

## 2020-07-18 NOTE — NUR
F/u: Lipase is trending towards normal limits, down to 2528 today. Noted that pt documented 
to have received EN during night shift. D/w RN who reports pt did receive EN over night 
however once this was observed by day shift EN was turned off and OG tube changed to 
suctioned.  Per RN pt not starting any alternative nutrition at this time. Diet order 
changed to NPO. Per MD notes pt to start dialysis. Estimated nutrient needs remain the same 
as they will provide additional protein needed to meet the demands of dialysis. Will 
continue to follow closely.

-------------------------------------------------------------------------------

Addendum: 07/18/20 at 1448 by Lupe Ashley RD

-------------------------------------------------------------------------------

Amended: Links added.

## 2020-07-18 NOTE — NUR
RICKI Chavez NP called re: blood sugar 64 mg/dl. Repeated doses of D50 throughout shift. Made 
aware of current fluid order of D5 with sodium Bicarb at 125ml/hr. Order for an additional 
bag of D10 at rate of 30 ml/hr IV.

## 2020-07-18 NOTE — NUR
Problems reprioritized. Patient report given, questions answered & plan of care reviewed 
with ARCHANA Bonilla.

## 2020-07-19 VITALS — DIASTOLIC BLOOD PRESSURE: 54 MMHG | SYSTOLIC BLOOD PRESSURE: 103 MMHG

## 2020-07-19 VITALS — DIASTOLIC BLOOD PRESSURE: 56 MMHG | SYSTOLIC BLOOD PRESSURE: 107 MMHG

## 2020-07-19 VITALS — DIASTOLIC BLOOD PRESSURE: 49 MMHG | SYSTOLIC BLOOD PRESSURE: 107 MMHG

## 2020-07-19 VITALS — SYSTOLIC BLOOD PRESSURE: 110 MMHG | DIASTOLIC BLOOD PRESSURE: 58 MMHG

## 2020-07-19 VITALS — DIASTOLIC BLOOD PRESSURE: 53 MMHG | SYSTOLIC BLOOD PRESSURE: 113 MMHG

## 2020-07-19 VITALS — DIASTOLIC BLOOD PRESSURE: 43 MMHG | SYSTOLIC BLOOD PRESSURE: 99 MMHG

## 2020-07-19 VITALS — DIASTOLIC BLOOD PRESSURE: 44 MMHG | SYSTOLIC BLOOD PRESSURE: 97 MMHG

## 2020-07-19 VITALS — SYSTOLIC BLOOD PRESSURE: 102 MMHG | DIASTOLIC BLOOD PRESSURE: 53 MMHG

## 2020-07-19 VITALS — SYSTOLIC BLOOD PRESSURE: 116 MMHG | DIASTOLIC BLOOD PRESSURE: 56 MMHG

## 2020-07-19 VITALS — SYSTOLIC BLOOD PRESSURE: 107 MMHG | DIASTOLIC BLOOD PRESSURE: 57 MMHG

## 2020-07-19 VITALS — DIASTOLIC BLOOD PRESSURE: 59 MMHG | SYSTOLIC BLOOD PRESSURE: 119 MMHG

## 2020-07-19 VITALS — SYSTOLIC BLOOD PRESSURE: 101 MMHG | DIASTOLIC BLOOD PRESSURE: 54 MMHG

## 2020-07-19 VITALS — SYSTOLIC BLOOD PRESSURE: 106 MMHG | DIASTOLIC BLOOD PRESSURE: 55 MMHG

## 2020-07-19 VITALS — DIASTOLIC BLOOD PRESSURE: 65 MMHG | SYSTOLIC BLOOD PRESSURE: 124 MMHG

## 2020-07-19 VITALS — SYSTOLIC BLOOD PRESSURE: 122 MMHG | DIASTOLIC BLOOD PRESSURE: 68 MMHG

## 2020-07-19 VITALS — DIASTOLIC BLOOD PRESSURE: 58 MMHG | SYSTOLIC BLOOD PRESSURE: 112 MMHG

## 2020-07-19 VITALS — SYSTOLIC BLOOD PRESSURE: 101 MMHG | DIASTOLIC BLOOD PRESSURE: 50 MMHG

## 2020-07-19 VITALS — SYSTOLIC BLOOD PRESSURE: 120 MMHG | DIASTOLIC BLOOD PRESSURE: 61 MMHG

## 2020-07-19 VITALS — SYSTOLIC BLOOD PRESSURE: 101 MMHG | DIASTOLIC BLOOD PRESSURE: 49 MMHG

## 2020-07-19 VITALS — SYSTOLIC BLOOD PRESSURE: 122 MMHG | DIASTOLIC BLOOD PRESSURE: 64 MMHG

## 2020-07-19 VITALS — DIASTOLIC BLOOD PRESSURE: 53 MMHG | SYSTOLIC BLOOD PRESSURE: 103 MMHG

## 2020-07-19 VITALS — DIASTOLIC BLOOD PRESSURE: 51 MMHG | SYSTOLIC BLOOD PRESSURE: 107 MMHG

## 2020-07-19 LAB
ALBUMIN SERPL BCP-MCNC: 2.9 G/DL (ref 3.4–5)
ALBUMIN/GLOB SERPL: 2.1 {RATIO} (ref 1.1–1.5)
ALP SERPL-CCNC: 96 IU/L (ref 46–116)
ALT SERPL W P-5'-P-CCNC: 2535 U/L (ref 12–78)
AMYLASE SERPL-CCNC: 190 U/L (ref 25–115)
ANION GAP SERPL CALCULATED.3IONS-SCNC: 15 MMOL/L (ref 8–16)
ARTERIAL PATENCY WRIST A: POSITIVE
AST SERPL W P-5'-P-CCNC: 5243 U/L (ref 10–37)
BASE EXCESS BLDA CALC-SCNC: -7.3 MMOL/L (ref -2–2)
BASOPHILS # BLD AUTO: 0 X10'3 (ref 0–0.2)
BASOPHILS NFR BLD AUTO: 0.1 % (ref 0–1)
BILIRUB SERPL-MCNC: 5.2 MG/DL (ref 0.1–1)
BODY TEMPERATURE: 36.3
BUN SERPL-MCNC: 38 MG/DL (ref 7–18)
BUN/CREAT SERPL: 6.7 (ref 5.4–32)
CALCIUM SERPL-MCNC: 6.4 MG/DL (ref 8.5–10.1)
CHLORIDE SERPL-SCNC: 103 MMOL/L (ref 99–107)
CO2 SERPL-SCNC: 23.1 MMOL/L (ref 24–32)
COHGB MFR BLDA: 0.3 % (ref 0–3.9)
CREAT SERPL-MCNC: 5.65 MG/DL (ref 0.6–1.1)
EOSINOPHIL # BLD AUTO: 0.1 X10'3 (ref 0–0.9)
EOSINOPHIL NFR BLD AUTO: 0.7 % (ref 0–6)
ERYTHROCYTE [DISTWIDTH] IN BLOOD BY AUTOMATED COUNT: 14.4 % (ref 11.5–14.5)
GFR SERPL CREATININE-BSD FRML MDRD: 11 ML/MIN
GLUCOSE SERPL-MCNC: 49 MG/DL (ref 70–104)
HCO3 BLDA-SCNC: 18.8 MMOL/L (ref 22–26)
HCT VFR BLD AUTO: 33.5 % (ref 42–52)
HGB BLD-MCNC: 11.1 G/DL (ref 14–17.9)
HGB BLDA-MCNC: 11.6 G/DL (ref 14–18)
LIPASE SERPL-CCNC: 782 U/L (ref 73–393)
LYMPHOCYTES # BLD AUTO: 0.6 X10'3 (ref 1.1–4.8)
LYMPHOCYTES NFR BLD AUTO: 3.5 % (ref 21–51)
MAGNESIUM SERPL-MCNC: 1.5 MG/DL (ref 1.5–2.4)
MCH RBC QN AUTO: 30.8 PG (ref 27–31)
MCHC RBC AUTO-ENTMCNC: 33.2 G/DL (ref 33–36.5)
MCV RBC AUTO: 92.9 FL (ref 78–98)
METHGB MFR BLDA: 0 % (ref 0–1.5)
MONOCYTES # BLD AUTO: 0.2 X10'3 (ref 0–0.9)
MONOCYTES NFR BLD AUTO: 1.4 % (ref 2–12)
NEUTROPHILS # BLD AUTO: 17.1 X10'3 (ref 1.8–7.7)
NEUTROPHILS NFR BLD AUTO: 94.3 % (ref 42–75)
O2/TOTAL GAS SETTING VFR VENT: 30 MMHG/%
OXYHGB MFR BLDA: 97.3 % (ref 94–97)
PCO2 TEMP ADJ BLDA: 38.6 MMHG (ref 35–48)
PEEP RESPIRATORY: 5 CM H2O
PHOSPHATE SERPL-MCNC: 6.8 MG/DL (ref 2.3–4.5)
PLATELET # BLD AUTO: 35 X10'3 (ref 140–440)
PMV BLD AUTO: 10.1 FL (ref 7.4–10.4)
PO2 TEMP ADJ BLD: 109.7 MMHG (ref 75–100)
POTASSIUM SERPL-SCNC: 4.9 MMOL/L (ref 3.5–5.1)
PROT SERPL-MCNC: 4.3 G/DL (ref 6.4–8.2)
RBC # BLD AUTO: 3.61 X10'6 (ref 4.7–6.1)
RESP RATE 1H: 18 B/MIN
SAO2 % BLDA: 97.6 % (ref 94–97)
SODIUM SERPL-SCNC: 141 MMOL/L (ref 135–145)
SPONT VT COMPRES GAS VOL SET UNCOR VENT: 450 ML
WBC # BLD AUTO: 18.1 X10'3 (ref 4.5–11)

## 2020-07-19 RX ADMIN — Medication PRN MLS/HR: at 04:19

## 2020-07-19 RX ADMIN — DEXTROSE MONOHYDRATE PRN ML: 25 INJECTION, SOLUTION INTRAVENOUS at 02:12

## 2020-07-19 RX ADMIN — Medication PRN MLS/HR: at 02:32

## 2020-07-19 RX ADMIN — TAZOBACTAM SODIUM AND PIPERACILLIN SODIUM SCH MLS/HR: 375; 3 INJECTION, SOLUTION INTRAVENOUS at 07:46

## 2020-07-19 RX ADMIN — TAZOBACTAM SODIUM AND PIPERACILLIN SODIUM SCH MLS/HR: 375; 3 INJECTION, SOLUTION INTRAVENOUS at 20:17

## 2020-07-19 RX ADMIN — DEXTROSE MONOHYDRATE PRN ML: 25 INJECTION, SOLUTION INTRAVENOUS at 17:44

## 2020-07-19 RX ADMIN — DEXTROSE MONOHYDRATE PRN ML: 25 INJECTION, SOLUTION INTRAVENOUS at 14:26

## 2020-07-19 RX ADMIN — Medication SCH MMU: at 07:46

## 2020-07-19 RX ADMIN — DEXTROSE MONOHYDRATE PRN ML: 25 INJECTION, SOLUTION INTRAVENOUS at 07:47

## 2020-07-19 RX ADMIN — DEXTROSE MONOHYDRATE PRN ML: 25 INJECTION, SOLUTION INTRAVENOUS at 10:37

## 2020-07-19 RX ADMIN — Medication SCH MLS/HR: at 14:27

## 2020-07-19 RX ADMIN — Medication SCH MLS/HR: at 04:15

## 2020-07-19 RX ADMIN — MINERAL OIL, PETROLATUM PRN INCH: 425; 568 OINTMENT OPHTHALMIC at 04:24

## 2020-07-19 RX ADMIN — DEXTROSE MONOHYDRATE PRN ML: 25 INJECTION, SOLUTION INTRAVENOUS at 19:06

## 2020-07-19 RX ADMIN — Medication SCH MMU: at 20:17

## 2020-07-19 NOTE — NUR
Patient opens eyes to pain, facial grimace present during repositioning and bed bath. 
Patient does not follow commands or track with his eyes. During log roll to change out linen 
under patient patient with resistance to upper extremities, increased respiratory rate to 
the 30's. Versed bolus of 2 mg administered and Versed drip restarted at 2 mg/hr. Patient 
responded well and appears more comfortable after medication bolus.

## 2020-07-19 NOTE — NUR
TF Consult: Pt lipase down to 782 and amylase down to 190 w/ Low Glu this 

AM s/p dex. OGTF to start today per intensivist. MAP 69 this AM. LBM 7/18. 

EN recs below given pt needs given DX sepsis, meth-induced multi-organ 

failure, intubation, EF 20%, and on HD for rhabdomyolysis per EMR. Will 

monitor for tolerance. Consider post-pyloric feeds if pancreatic enzymes 

increase following EN.

Recommend:

1. OGTF using vital AF at 80 ml/hr will provide 1912 ml volume, 2304 cals, 

144 gram protein, 1557 ml free water.

2. additional water flush 200ml Q4

3. PALB Q M/Th; Daily weights

4. routine bowel care

5. upon extubation; advance diet as medically indicated to heart healthy

-------------------------------------------------------------------------------

Addendum: 07/19/20 at 1211 by Tal Beltran RD

-------------------------------------------------------------------------------

Amended: Links added.

## 2020-07-19 NOTE — NUR
Problems reprioritized. Patient report given, questions answered & plan of care reviewed 
with ARCHANA Dyson.

## 2020-07-19 NOTE — NUR
0200 blood glucose 43 mg/dL. D50 administered. Patient repositioned, during repositioning 
patient with goose bumps only present on the left  side of his body. Patient eyes opened 
during repositioning, patient with right upward gaze, no tracking present. Patient with 
slight twitching to left arm noted by nursing staff. 2 mg Versed bolus administered. 

Re check of Blood glucose, 131mg/dL. will continue to monitor. Patient with continuous 
infusion of D10 at 30ml/hr per provider order.

## 2020-07-19 NOTE — NUR
Patient's wife called for update. Would like a phone call from the MD to discuss MRI and EEG 
results. Gave more in formation as to how patient was found down. Per his wife: he was found 
down at a neighbors house lodged between the toilet and the tub. Patients wife denied any 
recent injuries or fall that she is aware of. She will continue to be the main contact for 
the family.

## 2020-07-19 NOTE — NUR
RICKI Chavez NP notified of patients continued low blood glucose readings. informed that 
patient has been covered with Dextrose 50% 25 ml twice in the last 2 hours. Patient was 
started on Tube feeding today and is currently at 20 ml/hr. Goal is 80 ml/hr. ORDER: 
Increased D10 W to make up the difference of the rate of tube feed goal and step the rate of 
infusion of D10 down as the tube feeding is increased.

## 2020-07-19 NOTE — NUR
RICKI Chvaez NP notified of patients critical Platelet count of 35. No new orders. Also 
notified of patients blood glucose of 43 earlier this morning. Patient currently on D10 W at 
30 ml/hr. No new orders at this time. Advised to continue to monitor blood glucose and treat 
per protocol.

## 2020-07-19 NOTE — NUR
Patient in room CICU 2013. I have received report from  ARCHANA Dyson and had the opportunity 
to ask questions and assume patient care.

## 2020-07-20 VITALS — DIASTOLIC BLOOD PRESSURE: 67 MMHG | SYSTOLIC BLOOD PRESSURE: 116 MMHG

## 2020-07-20 VITALS — SYSTOLIC BLOOD PRESSURE: 124 MMHG | DIASTOLIC BLOOD PRESSURE: 67 MMHG

## 2020-07-20 VITALS — DIASTOLIC BLOOD PRESSURE: 42 MMHG | SYSTOLIC BLOOD PRESSURE: 84 MMHG

## 2020-07-20 VITALS — SYSTOLIC BLOOD PRESSURE: 122 MMHG | DIASTOLIC BLOOD PRESSURE: 63 MMHG

## 2020-07-20 VITALS — SYSTOLIC BLOOD PRESSURE: 103 MMHG | DIASTOLIC BLOOD PRESSURE: 53 MMHG

## 2020-07-20 VITALS — SYSTOLIC BLOOD PRESSURE: 124 MMHG | DIASTOLIC BLOOD PRESSURE: 64 MMHG

## 2020-07-20 VITALS — DIASTOLIC BLOOD PRESSURE: 65 MMHG | SYSTOLIC BLOOD PRESSURE: 117 MMHG

## 2020-07-20 VITALS — DIASTOLIC BLOOD PRESSURE: 63 MMHG | SYSTOLIC BLOOD PRESSURE: 113 MMHG

## 2020-07-20 VITALS — DIASTOLIC BLOOD PRESSURE: 62 MMHG | SYSTOLIC BLOOD PRESSURE: 122 MMHG

## 2020-07-20 VITALS — DIASTOLIC BLOOD PRESSURE: 65 MMHG | SYSTOLIC BLOOD PRESSURE: 134 MMHG

## 2020-07-20 VITALS — SYSTOLIC BLOOD PRESSURE: 100 MMHG | DIASTOLIC BLOOD PRESSURE: 64 MMHG

## 2020-07-20 VITALS — DIASTOLIC BLOOD PRESSURE: 64 MMHG | SYSTOLIC BLOOD PRESSURE: 120 MMHG

## 2020-07-20 VITALS — SYSTOLIC BLOOD PRESSURE: 118 MMHG | DIASTOLIC BLOOD PRESSURE: 69 MMHG

## 2020-07-20 VITALS — DIASTOLIC BLOOD PRESSURE: 58 MMHG | SYSTOLIC BLOOD PRESSURE: 120 MMHG

## 2020-07-20 VITALS — DIASTOLIC BLOOD PRESSURE: 67 MMHG | SYSTOLIC BLOOD PRESSURE: 120 MMHG

## 2020-07-20 VITALS — SYSTOLIC BLOOD PRESSURE: 127 MMHG | DIASTOLIC BLOOD PRESSURE: 63 MMHG

## 2020-07-20 VITALS — DIASTOLIC BLOOD PRESSURE: 58 MMHG | SYSTOLIC BLOOD PRESSURE: 110 MMHG

## 2020-07-20 VITALS — DIASTOLIC BLOOD PRESSURE: 67 MMHG | SYSTOLIC BLOOD PRESSURE: 114 MMHG

## 2020-07-20 VITALS — DIASTOLIC BLOOD PRESSURE: 71 MMHG | SYSTOLIC BLOOD PRESSURE: 132 MMHG

## 2020-07-20 VITALS — SYSTOLIC BLOOD PRESSURE: 127 MMHG | DIASTOLIC BLOOD PRESSURE: 66 MMHG

## 2020-07-20 VITALS — SYSTOLIC BLOOD PRESSURE: 113 MMHG | DIASTOLIC BLOOD PRESSURE: 71 MMHG

## 2020-07-20 VITALS — DIASTOLIC BLOOD PRESSURE: 68 MMHG | SYSTOLIC BLOOD PRESSURE: 126 MMHG

## 2020-07-20 VITALS — DIASTOLIC BLOOD PRESSURE: 47 MMHG | SYSTOLIC BLOOD PRESSURE: 83 MMHG

## 2020-07-20 VITALS — DIASTOLIC BLOOD PRESSURE: 61 MMHG | SYSTOLIC BLOOD PRESSURE: 108 MMHG

## 2020-07-20 LAB
ALBUMIN SERPL BCP-MCNC: 2.4 G/DL (ref 3.4–5)
ALBUMIN/GLOB SERPL: 1.4 {RATIO} (ref 1.1–1.5)
ALP SERPL-CCNC: 113 IU/L (ref 46–116)
ALT SERPL W P-5'-P-CCNC: 2726 U/L (ref 12–78)
AMYLASE SERPL-CCNC: 128 U/L (ref 25–115)
ANION GAP SERPL CALCULATED.3IONS-SCNC: 13 MMOL/L (ref 8–16)
ARTERIAL PATENCY WRIST A: POSITIVE
AST SERPL W P-5'-P-CCNC: 3816 U/L (ref 10–37)
BASE EXCESS BLDA CALC-SCNC: -5.7 MMOL/L (ref -2–2)
BASOPHILS # BLD AUTO: 0.1 X10'3 (ref 0–0.2)
BASOPHILS NFR BLD AUTO: 0.6 % (ref 0–1)
BILIRUB SERPL-MCNC: 7.4 MG/DL (ref 0.1–1)
BODY TEMPERATURE: 36.6
BUN SERPL-MCNC: 56 MG/DL (ref 7–18)
BUN/CREAT SERPL: 7.1 (ref 5.4–32)
CALCIUM SERPL-MCNC: 6.3 MG/DL (ref 8.5–10.1)
CHLORIDE SERPL-SCNC: 101 MMOL/L (ref 99–107)
CO2 SERPL-SCNC: 24.8 MMOL/L (ref 24–32)
COHGB MFR BLDA: 0.1 % (ref 0–3.9)
CREAT SERPL-MCNC: 7.94 MG/DL (ref 0.6–1.1)
EOSINOPHIL # BLD AUTO: 0.1 X10'3 (ref 0–0.9)
EOSINOPHIL NFR BLD AUTO: 1.1 % (ref 0–6)
ERYTHROCYTE [DISTWIDTH] IN BLOOD BY AUTOMATED COUNT: 14.8 % (ref 11.5–14.5)
GFR SERPL CREATININE-BSD FRML MDRD: 8 ML/MIN
GLUCOSE SERPL-MCNC: 66 MG/DL (ref 70–104)
HCO3 BLDA-SCNC: 20.3 MMOL/L (ref 22–26)
HCT VFR BLD AUTO: 32.7 % (ref 42–52)
HGB BLD-MCNC: 10.8 G/DL (ref 14–17.9)
HGB BLDA-MCNC: 11.3 G/DL (ref 14–18)
LIPASE SERPL-CCNC: 1380 U/L (ref 73–393)
LYMPHOCYTES # BLD AUTO: 0.6 X10'3 (ref 1.1–4.8)
LYMPHOCYTES NFR BLD AUTO: 5.1 % (ref 21–51)
MAGNESIUM SERPL-MCNC: 1.6 MG/DL (ref 1.5–2.4)
MCH RBC QN AUTO: 31 PG (ref 27–31)
MCHC RBC AUTO-ENTMCNC: 33 G/DL (ref 33–36.5)
MCV RBC AUTO: 93.8 FL (ref 78–98)
METHGB MFR BLDA: 0 % (ref 0–1.5)
MONOCYTES # BLD AUTO: 0.4 X10'3 (ref 0–0.9)
MONOCYTES NFR BLD AUTO: 3.1 % (ref 2–12)
NEUTROPHILS # BLD AUTO: 10.9 X10'3 (ref 1.8–7.7)
NEUTROPHILS NFR BLD AUTO: 90.1 % (ref 42–75)
O2/TOTAL GAS SETTING VFR VENT: 30 MMHG/%
OXYHGB MFR BLDA: 96.3 % (ref 94–97)
PCO2 TEMP ADJ BLDA: 41.4 MMHG (ref 35–48)
PEEP RESPIRATORY: 5 CM H2O
PHOSPHATE SERPL-MCNC: 7.6 MG/DL (ref 2.3–4.5)
PLATELET # BLD AUTO: 35 X10'3 (ref 140–440)
PMV BLD AUTO: 8.8 FL (ref 7.4–10.4)
PO2 TEMP ADJ BLD: 91.9 MMHG (ref 75–100)
POTASSIUM SERPL-SCNC: 4.5 MMOL/L (ref 3.5–5.1)
PREALB SERPL-MCNC: 12.8 MG/DL (ref 19–36)
PROT SERPL-MCNC: 4.1 G/DL (ref 6.4–8.2)
RBC # BLD AUTO: 3.48 X10'6 (ref 4.7–6.1)
SAO2 % BLDA: 96.4 % (ref 94–97)
SODIUM SERPL-SCNC: 139 MMOL/L (ref 135–145)
VANCOMYCIN SERPL-MCNC: 26.4 UG/ML
WBC # BLD AUTO: 12.2 X10'3 (ref 4.5–11)

## 2020-07-20 PROCEDURE — 5A1D70Z PERFORMANCE OF URINARY FILTRATION, INTERMITTENT, LESS THAN 6 HOURS PER DAY: ICD-10-PCS | Performed by: INTERNAL MEDICINE

## 2020-07-20 RX ADMIN — TAZOBACTAM SODIUM AND PIPERACILLIN SODIUM SCH MLS/HR: 375; 3 INJECTION, SOLUTION INTRAVENOUS at 19:51

## 2020-07-20 RX ADMIN — Medication PRN MLS/HR: at 08:15

## 2020-07-20 RX ADMIN — DEXTROSE MONOHYDRATE PRN ML: 25 INJECTION, SOLUTION INTRAVENOUS at 02:49

## 2020-07-20 RX ADMIN — Medication PRN MLS/HR: at 23:03

## 2020-07-20 RX ADMIN — TAZOBACTAM SODIUM AND PIPERACILLIN SODIUM SCH MLS/HR: 375; 3 INJECTION, SOLUTION INTRAVENOUS at 08:16

## 2020-07-20 RX ADMIN — MINERAL OIL, PETROLATUM SCH INCH: 425; 568 OINTMENT OPHTHALMIC at 05:06

## 2020-07-20 RX ADMIN — MINERAL OIL, PETROLATUM SCH INCH: 425; 568 OINTMENT OPHTHALMIC at 19:51

## 2020-07-20 RX ADMIN — MINERAL OIL, PETROLATUM SCH INCH: 425; 568 OINTMENT OPHTHALMIC at 14:17

## 2020-07-20 RX ADMIN — MINERAL OIL, PETROLATUM SCH INCH: 425; 568 OINTMENT OPHTHALMIC at 08:15

## 2020-07-20 RX ADMIN — DEXTROSE MONOHYDRATE PRN ML: 25 INJECTION, SOLUTION INTRAVENOUS at 20:43

## 2020-07-20 RX ADMIN — Medication SCH MLS/HR: at 00:05

## 2020-07-20 RX ADMIN — DEXTROSE MONOHYDRATE PRN ML: 25 INJECTION, SOLUTION INTRAVENOUS at 05:08

## 2020-07-20 RX ADMIN — Medication SCH MMU: at 19:51

## 2020-07-20 RX ADMIN — Medication SCH MMU: at 08:16

## 2020-07-20 RX ADMIN — Medication SCH MLS/HR: at 14:11

## 2020-07-20 RX ADMIN — Medication PRN MLS/HR: at 00:06

## 2020-07-20 NOTE — NUR
Tube feed residual 500mL per Tube feeding policy 300 mL returned and Tube feeing was 
resumed. To be rechecked in one hour.

## 2020-07-20 NOTE — NUR
RICKI Chavez NP at bedside. Patient continues to have progressive fine twitching with 
goosebumps that starts on the left side of patients neck and progresses downward and across 
to right side. Most of the time the twitching will start on the left side of his body. 
During these episodes patient has a change of color to white non blanchable skin to his 
hands, arms, knees and at times his face.  The color change appears to go away after muscle 
twitching episodes stop. This color change is increased during any type of tactile stimuli. 
Patient with Right upward gaze with eyes bilaterally. Versed boluses being administered as 
ordered. No new orders at this time.

## 2020-07-21 VITALS — DIASTOLIC BLOOD PRESSURE: 63 MMHG | SYSTOLIC BLOOD PRESSURE: 116 MMHG

## 2020-07-21 VITALS — DIASTOLIC BLOOD PRESSURE: 75 MMHG | SYSTOLIC BLOOD PRESSURE: 133 MMHG

## 2020-07-21 VITALS — SYSTOLIC BLOOD PRESSURE: 112 MMHG | DIASTOLIC BLOOD PRESSURE: 56 MMHG

## 2020-07-21 VITALS — SYSTOLIC BLOOD PRESSURE: 121 MMHG | DIASTOLIC BLOOD PRESSURE: 70 MMHG

## 2020-07-21 VITALS — DIASTOLIC BLOOD PRESSURE: 72 MMHG | SYSTOLIC BLOOD PRESSURE: 148 MMHG

## 2020-07-21 VITALS — SYSTOLIC BLOOD PRESSURE: 122 MMHG | DIASTOLIC BLOOD PRESSURE: 63 MMHG

## 2020-07-21 VITALS — DIASTOLIC BLOOD PRESSURE: 59 MMHG | SYSTOLIC BLOOD PRESSURE: 121 MMHG

## 2020-07-21 VITALS — DIASTOLIC BLOOD PRESSURE: 60 MMHG | SYSTOLIC BLOOD PRESSURE: 122 MMHG

## 2020-07-21 VITALS — DIASTOLIC BLOOD PRESSURE: 70 MMHG | SYSTOLIC BLOOD PRESSURE: 122 MMHG

## 2020-07-21 VITALS — SYSTOLIC BLOOD PRESSURE: 132 MMHG | DIASTOLIC BLOOD PRESSURE: 63 MMHG

## 2020-07-21 VITALS — SYSTOLIC BLOOD PRESSURE: 119 MMHG | DIASTOLIC BLOOD PRESSURE: 60 MMHG

## 2020-07-21 VITALS — SYSTOLIC BLOOD PRESSURE: 118 MMHG | DIASTOLIC BLOOD PRESSURE: 66 MMHG

## 2020-07-21 VITALS — SYSTOLIC BLOOD PRESSURE: 108 MMHG | DIASTOLIC BLOOD PRESSURE: 58 MMHG

## 2020-07-21 VITALS — SYSTOLIC BLOOD PRESSURE: 115 MMHG | DIASTOLIC BLOOD PRESSURE: 64 MMHG

## 2020-07-21 VITALS — SYSTOLIC BLOOD PRESSURE: 112 MMHG | DIASTOLIC BLOOD PRESSURE: 60 MMHG

## 2020-07-21 VITALS — SYSTOLIC BLOOD PRESSURE: 114 MMHG | DIASTOLIC BLOOD PRESSURE: 58 MMHG

## 2020-07-21 VITALS — SYSTOLIC BLOOD PRESSURE: 142 MMHG | DIASTOLIC BLOOD PRESSURE: 70 MMHG

## 2020-07-21 VITALS — DIASTOLIC BLOOD PRESSURE: 60 MMHG | SYSTOLIC BLOOD PRESSURE: 110 MMHG

## 2020-07-21 VITALS — SYSTOLIC BLOOD PRESSURE: 126 MMHG | DIASTOLIC BLOOD PRESSURE: 69 MMHG

## 2020-07-21 VITALS — SYSTOLIC BLOOD PRESSURE: 134 MMHG | DIASTOLIC BLOOD PRESSURE: 67 MMHG

## 2020-07-21 VITALS — SYSTOLIC BLOOD PRESSURE: 104 MMHG | DIASTOLIC BLOOD PRESSURE: 40 MMHG

## 2020-07-21 VITALS — DIASTOLIC BLOOD PRESSURE: 76 MMHG | SYSTOLIC BLOOD PRESSURE: 120 MMHG

## 2020-07-21 VITALS — DIASTOLIC BLOOD PRESSURE: 67 MMHG | SYSTOLIC BLOOD PRESSURE: 124 MMHG

## 2020-07-21 VITALS — SYSTOLIC BLOOD PRESSURE: 113 MMHG | DIASTOLIC BLOOD PRESSURE: 71 MMHG

## 2020-07-21 LAB
ALBUMIN SERPL BCP-MCNC: 2.1 G/DL (ref 3.4–5)
ALBUMIN/GLOB SERPL: 1.1 {RATIO} (ref 1.1–1.5)
ALP SERPL-CCNC: 118 IU/L (ref 46–116)
ALT SERPL W P-5'-P-CCNC: 2177 U/L (ref 12–78)
AMYLASE SERPL-CCNC: 125 U/L (ref 25–115)
ANION GAP SERPL CALCULATED.3IONS-SCNC: 10 MMOL/L (ref 8–16)
ARTERIAL PATENCY WRIST A: POSITIVE
AST SERPL W P-5'-P-CCNC: 2348 U/L (ref 10–37)
BASE EXCESS BLDA CALC-SCNC: -4 MMOL/L (ref -2–2)
BASOPHILS # BLD AUTO: 0 X10'3 (ref 0–0.2)
BASOPHILS NFR BLD AUTO: 0.7 % (ref 0–1)
BILIRUB SERPL-MCNC: 9.8 MG/DL (ref 0.1–1)
BODY TEMPERATURE: 37
BUN SERPL-MCNC: 42 MG/DL (ref 7–18)
BUN/CREAT SERPL: 6.4 (ref 5.4–32)
CALCIUM SERPL-MCNC: 6.8 MG/DL (ref 8.5–10.1)
CHLORIDE SERPL-SCNC: 100 MMOL/L (ref 99–107)
CO2 SERPL-SCNC: 26.4 MMOL/L (ref 24–32)
COHGB MFR BLDA: 0.3 % (ref 0–3.9)
CREAT SERPL-MCNC: 6.55 MG/DL (ref 0.6–1.1)
EOSINOPHIL # BLD AUTO: 0.1 X10'3 (ref 0–0.9)
EOSINOPHIL NFR BLD AUTO: 1.9 % (ref 0–6)
ERYTHROCYTE [DISTWIDTH] IN BLOOD BY AUTOMATED COUNT: 14.4 % (ref 11.5–14.5)
GFR SERPL CREATININE-BSD FRML MDRD: 10 ML/MIN
GLUCOSE SERPL-MCNC: 106 MG/DL (ref 70–104)
HCO3 BLDA-SCNC: 21.8 MMOL/L (ref 22–26)
HCT VFR BLD AUTO: 30.4 % (ref 42–52)
HGB BLD-MCNC: 10.2 G/DL (ref 14–17.9)
HGB BLDA-MCNC: 11.1 G/DL (ref 14–18)
LIPASE SERPL-CCNC: 1800 U/L (ref 73–393)
LYMPHOCYTES # BLD AUTO: 0.5 X10'3 (ref 1.1–4.8)
LYMPHOCYTES NFR BLD AUTO: 8.6 % (ref 21–51)
MAGNESIUM SERPL-MCNC: 1.9 MG/DL (ref 1.5–2.4)
MCH RBC QN AUTO: 31.1 PG (ref 27–31)
MCHC RBC AUTO-ENTMCNC: 33.5 G/DL (ref 33–36.5)
MCV RBC AUTO: 92.8 FL (ref 78–98)
METHGB MFR BLDA: 0.3 % (ref 0–1.5)
MONOCYTES # BLD AUTO: 0.4 X10'3 (ref 0–0.9)
MONOCYTES NFR BLD AUTO: 6.6 % (ref 2–12)
NEUTROPHILS # BLD AUTO: 4.9 X10'3 (ref 1.8–7.7)
NEUTROPHILS NFR BLD AUTO: 82.2 % (ref 42–75)
O2/TOTAL GAS SETTING VFR VENT: 30 MMHG/%
OXYHGB MFR BLDA: 93 % (ref 94–97)
PCO2 TEMP ADJ BLDA: 42.5 MMHG (ref 35–48)
PEEP RESPIRATORY: 5 CM H2O
PHOSPHATE SERPL-MCNC: 6.1 MG/DL (ref 2.3–4.5)
PLATELET # BLD AUTO: 35 X10'3 (ref 140–440)
PMV BLD AUTO: 8.8 FL (ref 7.4–10.4)
PO2 TEMP ADJ BLD: 76.3 MMHG (ref 75–100)
POTASSIUM SERPL-SCNC: 4.2 MMOL/L (ref 3.5–5.1)
PROT SERPL-MCNC: 4 G/DL (ref 6.4–8.2)
RBC # BLD AUTO: 3.28 X10'6 (ref 4.7–6.1)
RESP RATE 1H: 18 B/MIN
SAO2 % BLDA: 93.6 % (ref 94–97)
SODIUM SERPL-SCNC: 136 MMOL/L (ref 135–145)
SPONT VT COMPRES GAS VOL SET UNCOR VENT: 450 ML
VANCOMYCIN SERPL-MCNC: 17 UG/ML
WBC # BLD AUTO: 6 X10'3 (ref 4.5–11)

## 2020-07-21 RX ADMIN — TAZOBACTAM SODIUM AND PIPERACILLIN SODIUM SCH MLS/HR: 375; 3 INJECTION, SOLUTION INTRAVENOUS at 20:12

## 2020-07-21 RX ADMIN — MINERAL OIL, PETROLATUM SCH INCH: 425; 568 OINTMENT OPHTHALMIC at 15:13

## 2020-07-21 RX ADMIN — MINERAL OIL, PETROLATUM SCH INCH: 425; 568 OINTMENT OPHTHALMIC at 20:12

## 2020-07-21 RX ADMIN — MINERAL OIL, PETROLATUM SCH INCH: 425; 568 OINTMENT OPHTHALMIC at 02:36

## 2020-07-21 RX ADMIN — DEXTROSE MONOHYDRATE PRN ML: 25 INJECTION, SOLUTION INTRAVENOUS at 20:29

## 2020-07-21 RX ADMIN — Medication PRN MLS/HR: at 22:32

## 2020-07-21 RX ADMIN — Medication PRN MLS/HR: at 09:55

## 2020-07-21 RX ADMIN — TAZOBACTAM SODIUM AND PIPERACILLIN SODIUM SCH MLS/HR: 375; 3 INJECTION, SOLUTION INTRAVENOUS at 08:23

## 2020-07-21 RX ADMIN — Medication SCH MMU: at 08:24

## 2020-07-21 RX ADMIN — MINERAL OIL, PETROLATUM SCH INCH: 425; 568 OINTMENT OPHTHALMIC at 08:25

## 2020-07-21 RX ADMIN — Medication SCH MMU: at 20:12

## 2020-07-21 RX ADMIN — Medication SCH MLS/HR: at 09:54

## 2020-07-21 NOTE — NUR
At the 2000 residual check, PT's gastric residuals were 725ml. 300ml returned and 425 
discarded per policy. TF is on hold for 2 hrs and will recheck residuals per policy. PT also 
had a critical blood sugar of 44 @ 2026, 1 amp of D50 given,  recheck of blood sugar @ 2055 
resulted 130. Will continue to monitor.

## 2020-07-21 NOTE — NUR
Patient in room CICU 2013. I have received report from Madelaine MAGAÑA, and had the opportunity to 
ask questions and assume patient care.

## 2020-07-21 NOTE — NUR
HERON Chavez called D/T PT's blood sugar trending down. Order received to increase D10 from 
30ml/hr to 50mL/hr. Versed also turned back on d/t PT continuously having muscle spasms. 
Will continue to monitor.

## 2020-07-21 NOTE — NUR
Filemon trigger. Filemon is low, 8, skin is intact. High residuals in the 

night up to 640 ml, pt having routine bowel movements. Receiving fentanyl. 

Per RN at rounds pt is unresponsive, fixed pupils, tube feeding now at 20 

ml/hr. Not receiving prokinetic, however a prokinetic such as reglan may 

be contraindicated in the setting of neurological dysfunction. Receiving 

HD. 

Recommend:

1. OGTF using vital AF at 80 ml/hr will provide 1912 ml volume, 2304 cals,

144 gram protein, 1557 ml free water. Advance toward goal rate as tolerated. 

2. additional water flush 200ml Q4

3. PALB Q M/Th; Daily weights

4. routine bowel care

-------------------------------------------------------------------------------

Addendum: 07/21/20 at 1228 by Sherita Shirley RD

-------------------------------------------------------------------------------

Amended: Links added.

## 2020-07-22 VITALS — SYSTOLIC BLOOD PRESSURE: 121 MMHG | DIASTOLIC BLOOD PRESSURE: 46 MMHG

## 2020-07-22 VITALS — SYSTOLIC BLOOD PRESSURE: 112 MMHG | DIASTOLIC BLOOD PRESSURE: 61 MMHG

## 2020-07-22 VITALS — DIASTOLIC BLOOD PRESSURE: 59 MMHG | SYSTOLIC BLOOD PRESSURE: 113 MMHG

## 2020-07-22 VITALS — DIASTOLIC BLOOD PRESSURE: 60 MMHG | SYSTOLIC BLOOD PRESSURE: 124 MMHG

## 2020-07-22 VITALS — DIASTOLIC BLOOD PRESSURE: 58 MMHG | SYSTOLIC BLOOD PRESSURE: 126 MMHG

## 2020-07-22 VITALS — DIASTOLIC BLOOD PRESSURE: 58 MMHG | SYSTOLIC BLOOD PRESSURE: 108 MMHG

## 2020-07-22 VITALS — SYSTOLIC BLOOD PRESSURE: 122 MMHG | DIASTOLIC BLOOD PRESSURE: 60 MMHG

## 2020-07-22 VITALS — DIASTOLIC BLOOD PRESSURE: 57 MMHG | SYSTOLIC BLOOD PRESSURE: 96 MMHG

## 2020-07-22 VITALS — DIASTOLIC BLOOD PRESSURE: 55 MMHG | SYSTOLIC BLOOD PRESSURE: 104 MMHG

## 2020-07-22 VITALS — DIASTOLIC BLOOD PRESSURE: 56 MMHG | SYSTOLIC BLOOD PRESSURE: 123 MMHG

## 2020-07-22 VITALS — SYSTOLIC BLOOD PRESSURE: 122 MMHG | DIASTOLIC BLOOD PRESSURE: 61 MMHG

## 2020-07-22 VITALS — SYSTOLIC BLOOD PRESSURE: 105 MMHG | DIASTOLIC BLOOD PRESSURE: 53 MMHG

## 2020-07-22 VITALS — SYSTOLIC BLOOD PRESSURE: 106 MMHG | DIASTOLIC BLOOD PRESSURE: 58 MMHG

## 2020-07-22 VITALS — SYSTOLIC BLOOD PRESSURE: 100 MMHG | DIASTOLIC BLOOD PRESSURE: 51 MMHG

## 2020-07-22 VITALS — DIASTOLIC BLOOD PRESSURE: 62 MMHG | SYSTOLIC BLOOD PRESSURE: 98 MMHG

## 2020-07-22 VITALS — SYSTOLIC BLOOD PRESSURE: 106 MMHG | DIASTOLIC BLOOD PRESSURE: 55 MMHG

## 2020-07-22 VITALS — SYSTOLIC BLOOD PRESSURE: 90 MMHG | DIASTOLIC BLOOD PRESSURE: 54 MMHG

## 2020-07-22 VITALS — DIASTOLIC BLOOD PRESSURE: 64 MMHG | SYSTOLIC BLOOD PRESSURE: 120 MMHG

## 2020-07-22 VITALS — DIASTOLIC BLOOD PRESSURE: 63 MMHG | SYSTOLIC BLOOD PRESSURE: 123 MMHG

## 2020-07-22 VITALS — DIASTOLIC BLOOD PRESSURE: 60 MMHG | SYSTOLIC BLOOD PRESSURE: 99 MMHG

## 2020-07-22 VITALS — SYSTOLIC BLOOD PRESSURE: 126 MMHG | DIASTOLIC BLOOD PRESSURE: 63 MMHG

## 2020-07-22 VITALS — SYSTOLIC BLOOD PRESSURE: 110 MMHG | DIASTOLIC BLOOD PRESSURE: 54 MMHG

## 2020-07-22 VITALS — SYSTOLIC BLOOD PRESSURE: 123 MMHG | DIASTOLIC BLOOD PRESSURE: 68 MMHG

## 2020-07-22 LAB
ALBUMIN SERPL BCP-MCNC: 2 G/DL (ref 3.4–5)
ALBUMIN/GLOB SERPL: 1 {RATIO} (ref 1.1–1.5)
ALP SERPL-CCNC: 124 IU/L (ref 46–116)
ALT SERPL W P-5'-P-CCNC: 1438 U/L (ref 12–78)
AMYLASE SERPL-CCNC: 128 U/L (ref 25–115)
ANION GAP SERPL CALCULATED.3IONS-SCNC: 10 MMOL/L (ref 8–16)
ARTERIAL PATENCY WRIST A: POSITIVE
AST SERPL W P-5'-P-CCNC: 990 U/L (ref 10–37)
BASE EXCESS BLDA CALC-SCNC: -4.5 MMOL/L (ref -2–2)
BASOPHILS # BLD AUTO: 0 X10'3 (ref 0–0.2)
BASOPHILS NFR BLD AUTO: 0.5 % (ref 0–1)
BILIRUB SERPL-MCNC: 12 MG/DL (ref 0.1–1)
BODY TEMPERATURE: 37.3
BUN SERPL-MCNC: 66 MG/DL (ref 7–18)
BUN/CREAT SERPL: 7.6 (ref 5.4–32)
CALCIUM SERPL-MCNC: 6.7 MG/DL (ref 8.5–10.1)
CHLORIDE SERPL-SCNC: 100 MMOL/L (ref 99–107)
CO2 SERPL-SCNC: 23.6 MMOL/L (ref 24–32)
COHGB MFR BLDA: 0.3 % (ref 0–3.9)
CREAT SERPL-MCNC: 8.68 MG/DL (ref 0.6–1.1)
EOSINOPHIL # BLD AUTO: 0.1 X10'3 (ref 0–0.9)
EOSINOPHIL NFR BLD AUTO: 1.5 % (ref 0–6)
ERYTHROCYTE [DISTWIDTH] IN BLOOD BY AUTOMATED COUNT: 14.8 % (ref 11.5–14.5)
GFR SERPL CREATININE-BSD FRML MDRD: 7 ML/MIN
GLUCOSE SERPL-MCNC: 69 MG/DL (ref 70–104)
HCO3 BLDA-SCNC: 19.9 MMOL/L (ref 22–26)
HCT VFR BLD AUTO: 27.7 % (ref 42–52)
HGB BLD-MCNC: 9.3 G/DL (ref 14–17.9)
HGB BLDA-MCNC: 9.5 G/DL (ref 14–18)
LIPASE SERPL-CCNC: 1429 U/L (ref 73–393)
LYMPHOCYTES # BLD AUTO: 0.3 X10'3 (ref 1.1–4.8)
LYMPHOCYTES NFR BLD AUTO: 5.6 % (ref 21–51)
MAGNESIUM SERPL-MCNC: 2.2 MG/DL (ref 1.5–2.4)
MCH RBC QN AUTO: 30.8 PG (ref 27–31)
MCHC RBC AUTO-ENTMCNC: 33.5 G/DL (ref 33–36.5)
MCV RBC AUTO: 91.9 FL (ref 78–98)
METHGB MFR BLDA: 0.3 % (ref 0–1.5)
MONOCYTES # BLD AUTO: 0.3 X10'3 (ref 0–0.9)
MONOCYTES NFR BLD AUTO: 6.2 % (ref 2–12)
NEUTROPHILS # BLD AUTO: 4.6 X10'3 (ref 1.8–7.7)
NEUTROPHILS NFR BLD AUTO: 86.2 % (ref 42–75)
O2/TOTAL GAS SETTING VFR VENT: 30 MMHG/%
OXYHGB MFR BLDA: 96.6 % (ref 94–97)
PCO2 TEMP ADJ BLDA: 34.3 MMHG (ref 35–48)
PEEP RESPIRATORY: 5 CM H2O
PHOSPHATE SERPL-MCNC: 7.6 MG/DL (ref 2.3–4.5)
PLATELET # BLD AUTO: 35 X10'3 (ref 140–440)
PMV BLD AUTO: 8.9 FL (ref 7.4–10.4)
PO2 TEMP ADJ BLD: 106.8 MMHG (ref 75–100)
POTASSIUM SERPL-SCNC: 4.6 MMOL/L (ref 3.5–5.1)
PROT SERPL-MCNC: 4 G/DL (ref 6.4–8.2)
RBC # BLD AUTO: 3.02 X10'6 (ref 4.7–6.1)
RESP RATE 1H: 18 B/MIN
SAO2 % BLDA: 97.2 % (ref 94–97)
SODIUM SERPL-SCNC: 134 MMOL/L (ref 135–145)
SPONT VT COMPRES GAS VOL SET UNCOR VENT: 450 ML
VANCOMYCIN SERPL-MCNC: 14.7 UG/ML
WBC # BLD AUTO: 5.3 X10'3 (ref 4.5–11)

## 2020-07-22 PROCEDURE — 5A1D70Z PERFORMANCE OF URINARY FILTRATION, INTERMITTENT, LESS THAN 6 HOURS PER DAY: ICD-10-PCS | Performed by: INTERNAL MEDICINE

## 2020-07-22 RX ADMIN — DEXTROSE MONOHYDRATE PRN ML: 25 INJECTION, SOLUTION INTRAVENOUS at 06:07

## 2020-07-22 RX ADMIN — VANCOMYCIN HYDROCHLORIDE ONE MLS/HR: 750 INJECTION, POWDER, LYOPHILIZED, FOR SOLUTION INTRAVENOUS at 08:38

## 2020-07-22 RX ADMIN — VANCOMYCIN HYDROCHLORIDE ONE MLS/HR: 750 INJECTION, POWDER, LYOPHILIZED, FOR SOLUTION INTRAVENOUS at 09:00

## 2020-07-22 RX ADMIN — DEXTROSE MONOHYDRATE PRN ML: 25 INJECTION, SOLUTION INTRAVENOUS at 02:29

## 2020-07-22 RX ADMIN — MINERAL OIL, PETROLATUM SCH INCH: 425; 568 OINTMENT OPHTHALMIC at 19:40

## 2020-07-22 RX ADMIN — Medication PRN MLS/HR: at 18:08

## 2020-07-22 RX ADMIN — DEXTROSE MONOHYDRATE PRN ML: 25 INJECTION, SOLUTION INTRAVENOUS at 07:24

## 2020-07-22 RX ADMIN — MINERAL OIL, PETROLATUM SCH INCH: 425; 568 OINTMENT OPHTHALMIC at 08:38

## 2020-07-22 RX ADMIN — MINERAL OIL, PETROLATUM SCH INCH: 425; 568 OINTMENT OPHTHALMIC at 14:36

## 2020-07-22 RX ADMIN — DEXTROSE MONOHYDRATE PRN ML: 25 INJECTION, SOLUTION INTRAVENOUS at 19:37

## 2020-07-22 RX ADMIN — DEXTROSE MONOHYDRATE PRN ML: 25 INJECTION, SOLUTION INTRAVENOUS at 04:20

## 2020-07-22 RX ADMIN — LEVOFLOXACIN SCH MLS/HR: 5 INJECTION, SOLUTION INTRAVENOUS at 12:46

## 2020-07-22 RX ADMIN — Medication PRN MLS/HR: at 14:34

## 2020-07-22 RX ADMIN — Medication SCH MMU: at 08:37

## 2020-07-22 RX ADMIN — MINERAL OIL, PETROLATUM SCH INCH: 425; 568 OINTMENT OPHTHALMIC at 02:29

## 2020-07-22 NOTE — NUR
Gastric residuals continue to be greater than 300ml, 0000 check resulted 375ml. TF continues 
to be on hold per policy. Will recheck and continue to monitor.

## 2020-07-22 NOTE — NUR
Patient in room CICU 2013. I have received report from Alisia MAGAÑA, and had the opportunity to 
ask questions and assume patient care.

## 2020-07-22 NOTE — NUR
Gastric residuals continue to be high, 0400 check resulted 425. TF continues to be on hold. 
Blood sugar was 64, 25ml PRN D50 given.  Will continue to monitor.

## 2020-07-22 NOTE — NUR
Problems reprioritized. Patient report given, questions answered & plan of care reviewed 
with Alisia MAGAÑA.

## 2020-07-22 NOTE — NUR
Mariela from [facility] called for an update on PT status. Stated they would continue to 
follow.

## 2020-07-23 VITALS — SYSTOLIC BLOOD PRESSURE: 114 MMHG | DIASTOLIC BLOOD PRESSURE: 54 MMHG

## 2020-07-23 VITALS — DIASTOLIC BLOOD PRESSURE: 78 MMHG | SYSTOLIC BLOOD PRESSURE: 113 MMHG

## 2020-07-23 VITALS — DIASTOLIC BLOOD PRESSURE: 56 MMHG | SYSTOLIC BLOOD PRESSURE: 119 MMHG

## 2020-07-23 VITALS — SYSTOLIC BLOOD PRESSURE: 111 MMHG | DIASTOLIC BLOOD PRESSURE: 56 MMHG

## 2020-07-23 VITALS — DIASTOLIC BLOOD PRESSURE: 57 MMHG | SYSTOLIC BLOOD PRESSURE: 109 MMHG

## 2020-07-23 VITALS — DIASTOLIC BLOOD PRESSURE: 47 MMHG | SYSTOLIC BLOOD PRESSURE: 118 MMHG

## 2020-07-23 VITALS — DIASTOLIC BLOOD PRESSURE: 54 MMHG | SYSTOLIC BLOOD PRESSURE: 115 MMHG

## 2020-07-23 VITALS — DIASTOLIC BLOOD PRESSURE: 58 MMHG | SYSTOLIC BLOOD PRESSURE: 111 MMHG

## 2020-07-23 VITALS — SYSTOLIC BLOOD PRESSURE: 112 MMHG | DIASTOLIC BLOOD PRESSURE: 50 MMHG

## 2020-07-23 VITALS — SYSTOLIC BLOOD PRESSURE: 110 MMHG | DIASTOLIC BLOOD PRESSURE: 54 MMHG

## 2020-07-23 VITALS — DIASTOLIC BLOOD PRESSURE: 54 MMHG | SYSTOLIC BLOOD PRESSURE: 121 MMHG

## 2020-07-23 VITALS — DIASTOLIC BLOOD PRESSURE: 53 MMHG | SYSTOLIC BLOOD PRESSURE: 115 MMHG

## 2020-07-23 VITALS — DIASTOLIC BLOOD PRESSURE: 53 MMHG | SYSTOLIC BLOOD PRESSURE: 123 MMHG

## 2020-07-23 VITALS — DIASTOLIC BLOOD PRESSURE: 50 MMHG | SYSTOLIC BLOOD PRESSURE: 111 MMHG

## 2020-07-23 VITALS — DIASTOLIC BLOOD PRESSURE: 55 MMHG | SYSTOLIC BLOOD PRESSURE: 120 MMHG

## 2020-07-23 VITALS — SYSTOLIC BLOOD PRESSURE: 115 MMHG | DIASTOLIC BLOOD PRESSURE: 56 MMHG

## 2020-07-23 VITALS — SYSTOLIC BLOOD PRESSURE: 124 MMHG | DIASTOLIC BLOOD PRESSURE: 56 MMHG

## 2020-07-23 VITALS — SYSTOLIC BLOOD PRESSURE: 114 MMHG | DIASTOLIC BLOOD PRESSURE: 64 MMHG

## 2020-07-23 VITALS — DIASTOLIC BLOOD PRESSURE: 56 MMHG | SYSTOLIC BLOOD PRESSURE: 116 MMHG

## 2020-07-23 VITALS — DIASTOLIC BLOOD PRESSURE: 56 MMHG | SYSTOLIC BLOOD PRESSURE: 106 MMHG

## 2020-07-23 VITALS — SYSTOLIC BLOOD PRESSURE: 109 MMHG | DIASTOLIC BLOOD PRESSURE: 55 MMHG

## 2020-07-23 VITALS — SYSTOLIC BLOOD PRESSURE: 115 MMHG | DIASTOLIC BLOOD PRESSURE: 50 MMHG

## 2020-07-23 LAB
ALBUMIN SERPL BCP-MCNC: 1.8 G/DL (ref 3.4–5)
ALBUMIN/GLOB SERPL: 0.8 {RATIO} (ref 1.1–1.5)
ALP SERPL-CCNC: 135 IU/L (ref 46–116)
ALT SERPL W P-5'-P-CCNC: 886 U/L (ref 12–78)
ANION GAP SERPL CALCULATED.3IONS-SCNC: 8 MMOL/L (ref 8–16)
ARTERIAL PATENCY WRIST A: POSITIVE
AST SERPL W P-5'-P-CCNC: 477 U/L (ref 10–37)
BASE EXCESS BLDA CALC-SCNC: -1 MMOL/L (ref -2–2)
BASOPHILS # BLD AUTO: 0 X10'3 (ref 0–0.2)
BASOPHILS NFR BLD AUTO: 0.3 % (ref 0–1)
BILIRUB SERPL-MCNC: 12.4 MG/DL (ref 0.1–1)
BODY TEMPERATURE: 37.1
BUN SERPL-MCNC: 48 MG/DL (ref 7–18)
BUN/CREAT SERPL: 7.3 (ref 5.4–32)
CALCIUM SERPL-MCNC: 7.2 MG/DL (ref 8.5–10.1)
CHLORIDE SERPL-SCNC: 98 MMOL/L (ref 99–107)
CO2 SERPL-SCNC: 27.8 MMOL/L (ref 24–32)
COHGB MFR BLDA: 0.4 % (ref 0–3.9)
CREAT SERPL-MCNC: 6.55 MG/DL (ref 0.6–1.1)
EOSINOPHIL # BLD AUTO: 0.1 X10'3 (ref 0–0.9)
EOSINOPHIL NFR BLD AUTO: 1.2 % (ref 0–6)
ERYTHROCYTE [DISTWIDTH] IN BLOOD BY AUTOMATED COUNT: 14.3 % (ref 11.5–14.5)
GFR SERPL CREATININE-BSD FRML MDRD: 10 ML/MIN
GLUCOSE SERPL-MCNC: 81 MG/DL (ref 70–104)
HCO3 BLDA-SCNC: 24.1 MMOL/L (ref 22–26)
HCT VFR BLD AUTO: 24.6 % (ref 42–52)
HGB BLD-MCNC: 8.4 G/DL (ref 14–17.9)
HGB BLDA-MCNC: 9.2 G/DL (ref 14–18)
LYMPHOCYTES # BLD AUTO: 0.5 X10'3 (ref 1.1–4.8)
LYMPHOCYTES NFR BLD AUTO: 6.8 % (ref 21–51)
MAGNESIUM SERPL-MCNC: 2 MG/DL (ref 1.5–2.4)
MCH RBC QN AUTO: 31.2 PG (ref 27–31)
MCHC RBC AUTO-ENTMCNC: 34.2 G/DL (ref 33–36.5)
MCV RBC AUTO: 91.2 FL (ref 78–98)
METHGB MFR BLDA: 0 % (ref 0–1.5)
MONOCYTES # BLD AUTO: 0.5 X10'3 (ref 0–0.9)
MONOCYTES NFR BLD AUTO: 6.4 % (ref 2–12)
NEUTROPHILS # BLD AUTO: 6.2 X10'3 (ref 1.8–7.7)
NEUTROPHILS NFR BLD AUTO: 85.3 % (ref 42–75)
O2/TOTAL GAS SETTING VFR VENT: 30 MMHG/%
OXYHGB MFR BLDA: 89.4 % (ref 94–97)
PCO2 TEMP ADJ BLDA: 42.4 MMHG (ref 35–48)
PEEP RESPIRATORY: 5 CM H2O
PHOSPHATE SERPL-MCNC: 5.1 MG/DL (ref 2.3–4.5)
PLATELET # BLD AUTO: 56 X10'3 (ref 140–440)
PMV BLD AUTO: 9.7 FL (ref 7.4–10.4)
PO2 TEMP ADJ BLD: 63.6 MMHG (ref 75–100)
POTASSIUM SERPL-SCNC: 4.1 MMOL/L (ref 3.5–5.1)
PREALB SERPL-MCNC: 11.3 MG/DL (ref 19–36)
PROT SERPL-MCNC: 4.1 G/DL (ref 6.4–8.2)
RBC # BLD AUTO: 2.7 X10'6 (ref 4.7–6.1)
RESP RATE 1H: 18 B/MIN
SAO2 % BLDA: 89.8 % (ref 94–97)
SODIUM SERPL-SCNC: 134 MMOL/L (ref 135–145)
SPONT VT COMPRES GAS VOL SET UNCOR VENT: 450 ML
WBC # BLD AUTO: 7.3 X10'3 (ref 4.5–11)

## 2020-07-23 RX ADMIN — DEXTROSE MONOHYDRATE PRN ML: 25 INJECTION, SOLUTION INTRAVENOUS at 20:40

## 2020-07-23 RX ADMIN — Medication SCH MMU: at 23:08

## 2020-07-23 RX ADMIN — MINERAL OIL, PETROLATUM SCH INCH: 425; 568 OINTMENT OPHTHALMIC at 19:15

## 2020-07-23 RX ADMIN — LEVOFLOXACIN SCH MLS/HR: 5 INJECTION, SOLUTION INTRAVENOUS at 08:56

## 2020-07-23 RX ADMIN — MINERAL OIL, PETROLATUM SCH INCH: 425; 568 OINTMENT OPHTHALMIC at 01:46

## 2020-07-23 RX ADMIN — MINERAL OIL, PETROLATUM SCH INCH: 425; 568 OINTMENT OPHTHALMIC at 08:55

## 2020-07-23 RX ADMIN — METOCLOPRAMIDE SCH MG: 5 INJECTION, SOLUTION INTRAMUSCULAR; INTRAVENOUS at 13:48

## 2020-07-23 RX ADMIN — METOCLOPRAMIDE SCH MG: 5 INJECTION, SOLUTION INTRAMUSCULAR; INTRAVENOUS at 19:15

## 2020-07-23 RX ADMIN — Medication PRN MLS/HR: at 06:47

## 2020-07-23 RX ADMIN — Medication SCH MLS/HR: at 01:54

## 2020-07-23 RX ADMIN — MINERAL OIL, PETROLATUM SCH INCH: 425; 568 OINTMENT OPHTHALMIC at 13:49

## 2020-07-23 NOTE — NUR
Reassessment: TF on hold d/t high GRV. MURIEL discussed at rounds 7/22 the 

elevated GRV and patient having delayed gastric emptying. Noted that today 

relistor and reglan were added. Pt is stooling. Pending MD discussion with 

family. 

 



Recommend:

1. OGTF using vital AF at 80 ml/hr will provide 1912 ml volume, 2304 cals,

144 gram protein, 1557 ml free water. Advance toward goal rate as

tolerated.

2. additional water flush 200ml Q4

3. PALB Q M/Th; Daily weights

4. routine bowel care, reglan and relistor

-------------------------------------------------------------------------------

Addendum: 07/23/20 at 1553 by Sherita Shirley RD

-------------------------------------------------------------------------------

Amended: Links added.

## 2020-07-23 NOTE — NUR
Patient in room CICU 2013. I have received report from  RN  and had the opportunity to ask 
questions and assume patient care.

## 2020-07-24 VITALS — SYSTOLIC BLOOD PRESSURE: 132 MMHG | DIASTOLIC BLOOD PRESSURE: 63 MMHG

## 2020-07-24 VITALS — SYSTOLIC BLOOD PRESSURE: 122 MMHG | DIASTOLIC BLOOD PRESSURE: 62 MMHG

## 2020-07-24 VITALS — SYSTOLIC BLOOD PRESSURE: 130 MMHG | DIASTOLIC BLOOD PRESSURE: 55 MMHG

## 2020-07-24 VITALS — SYSTOLIC BLOOD PRESSURE: 137 MMHG | DIASTOLIC BLOOD PRESSURE: 52 MMHG

## 2020-07-24 VITALS — DIASTOLIC BLOOD PRESSURE: 63 MMHG | SYSTOLIC BLOOD PRESSURE: 128 MMHG

## 2020-07-24 VITALS — SYSTOLIC BLOOD PRESSURE: 119 MMHG | DIASTOLIC BLOOD PRESSURE: 61 MMHG

## 2020-07-24 VITALS — SYSTOLIC BLOOD PRESSURE: 117 MMHG | DIASTOLIC BLOOD PRESSURE: 61 MMHG

## 2020-07-24 VITALS — SYSTOLIC BLOOD PRESSURE: 125 MMHG | DIASTOLIC BLOOD PRESSURE: 67 MMHG

## 2020-07-24 VITALS — DIASTOLIC BLOOD PRESSURE: 62 MMHG | SYSTOLIC BLOOD PRESSURE: 122 MMHG

## 2020-07-24 VITALS — DIASTOLIC BLOOD PRESSURE: 66 MMHG | SYSTOLIC BLOOD PRESSURE: 123 MMHG

## 2020-07-24 VITALS — DIASTOLIC BLOOD PRESSURE: 58 MMHG | SYSTOLIC BLOOD PRESSURE: 126 MMHG

## 2020-07-24 VITALS — SYSTOLIC BLOOD PRESSURE: 121 MMHG | DIASTOLIC BLOOD PRESSURE: 55 MMHG

## 2020-07-24 VITALS — SYSTOLIC BLOOD PRESSURE: 124 MMHG | DIASTOLIC BLOOD PRESSURE: 59 MMHG

## 2020-07-24 VITALS — SYSTOLIC BLOOD PRESSURE: 125 MMHG | DIASTOLIC BLOOD PRESSURE: 63 MMHG

## 2020-07-24 VITALS — DIASTOLIC BLOOD PRESSURE: 62 MMHG | SYSTOLIC BLOOD PRESSURE: 126 MMHG

## 2020-07-24 VITALS — DIASTOLIC BLOOD PRESSURE: 61 MMHG | SYSTOLIC BLOOD PRESSURE: 129 MMHG

## 2020-07-24 VITALS — DIASTOLIC BLOOD PRESSURE: 60 MMHG | SYSTOLIC BLOOD PRESSURE: 99 MMHG

## 2020-07-24 VITALS — DIASTOLIC BLOOD PRESSURE: 63 MMHG | SYSTOLIC BLOOD PRESSURE: 116 MMHG

## 2020-07-24 VITALS — DIASTOLIC BLOOD PRESSURE: 69 MMHG | SYSTOLIC BLOOD PRESSURE: 128 MMHG

## 2020-07-24 VITALS — DIASTOLIC BLOOD PRESSURE: 67 MMHG | SYSTOLIC BLOOD PRESSURE: 109 MMHG

## 2020-07-24 VITALS — SYSTOLIC BLOOD PRESSURE: 118 MMHG | DIASTOLIC BLOOD PRESSURE: 56 MMHG

## 2020-07-24 LAB
ALBUMIN SERPL BCP-MCNC: 1.7 G/DL (ref 3.4–5)
ALBUMIN/GLOB SERPL: 0.7 {RATIO} (ref 1.1–1.5)
ALP SERPL-CCNC: 151 IU/L (ref 46–116)
ALT SERPL W P-5'-P-CCNC: 620 U/L (ref 12–78)
ANION GAP SERPL CALCULATED.3IONS-SCNC: 11 MMOL/L (ref 8–16)
ARTERIAL PATENCY WRIST A: POSITIVE
AST SERPL W P-5'-P-CCNC: 292 U/L (ref 10–37)
BASE EXCESS BLDA CALC-SCNC: -6 MMOL/L (ref -2–2)
BASOPHILS # BLD AUTO: 0 X10'3 (ref 0–0.2)
BASOPHILS NFR BLD AUTO: 0.2 % (ref 0–1)
BILIRUB SERPL-MCNC: 13.2 MG/DL (ref 0.1–1)
BODY TEMPERATURE: 36.9
BUN SERPL-MCNC: 70 MG/DL (ref 7–18)
BUN/CREAT SERPL: 8.2 (ref 5.4–32)
CALCIUM SERPL-MCNC: 7.7 MG/DL (ref 8.5–10.1)
CHLORIDE SERPL-SCNC: 95 MMOL/L (ref 99–107)
CO2 SERPL-SCNC: 23.9 MMOL/L (ref 24–32)
COHGB MFR BLDA: 0.8 % (ref 0–3.9)
CREAT SERPL-MCNC: 8.55 MG/DL (ref 0.6–1.1)
EOSINOPHIL # BLD AUTO: 0.1 X10'3 (ref 0–0.9)
EOSINOPHIL NFR BLD AUTO: 0.8 % (ref 0–6)
ERYTHROCYTE [DISTWIDTH] IN BLOOD BY AUTOMATED COUNT: 14.2 % (ref 11.5–14.5)
GFR SERPL CREATININE-BSD FRML MDRD: 7 ML/MIN
GLUCOSE SERPL-MCNC: 70 MG/DL (ref 70–104)
HCO3 BLDA-SCNC: 19 MMOL/L (ref 22–26)
HCT VFR BLD AUTO: 24 % (ref 42–52)
HGB BLD-MCNC: 8.2 G/DL (ref 14–17.9)
HGB BLDA-MCNC: 8.5 G/DL (ref 14–18)
LYMPHOCYTES # BLD AUTO: 0.6 X10'3 (ref 1.1–4.8)
LYMPHOCYTES NFR BLD AUTO: 6.8 % (ref 21–51)
MAGNESIUM SERPL-MCNC: 2.1 MG/DL (ref 1.5–2.4)
MCH RBC QN AUTO: 31.8 PG (ref 27–31)
MCHC RBC AUTO-ENTMCNC: 34.2 G/DL (ref 33–36.5)
MCV RBC AUTO: 93 FL (ref 78–98)
METHGB MFR BLDA: 0 % (ref 0–1.5)
MONOCYTES # BLD AUTO: 0.5 X10'3 (ref 0–0.9)
MONOCYTES NFR BLD AUTO: 5.1 % (ref 2–12)
NEUTROPHILS # BLD AUTO: 8.1 X10'3 (ref 1.8–7.7)
NEUTROPHILS NFR BLD AUTO: 87.1 % (ref 42–75)
O2/TOTAL GAS SETTING VFR VENT: 30 MMHG/%
OXYHGB MFR BLDA: 97.2 % (ref 94–97)
PCO2 TEMP ADJ BLDA: 35.1 MMHG (ref 35–48)
PEEP RESPIRATORY: 5 CM H2O
PHOSPHATE SERPL-MCNC: 7 MG/DL (ref 2.3–4.5)
PLATELET # BLD AUTO: 79 X10'3 (ref 140–440)
PMV BLD AUTO: 9.3 FL (ref 7.4–10.4)
PO2 TEMP ADJ BLD: 115 MMHG (ref 75–100)
POTASSIUM SERPL-SCNC: 4.4 MMOL/L (ref 3.5–5.1)
PROT SERPL-MCNC: 4.2 G/DL (ref 6.4–8.2)
RBC # BLD AUTO: 2.58 X10'6 (ref 4.7–6.1)
RESP RATE 1H: 18 B/MIN
SAO2 % BLDA: 98 % (ref 94–97)
SODIUM SERPL-SCNC: 130 MMOL/L (ref 135–145)
SPONT VT COMPRES GAS VOL SET UNCOR VENT: 450 ML
WBC # BLD AUTO: 9.3 X10'3 (ref 4.5–11)

## 2020-07-24 RX ADMIN — MINERAL OIL, PETROLATUM SCH INCH: 425; 568 OINTMENT OPHTHALMIC at 02:47

## 2020-07-24 RX ADMIN — Medication PRN MLS/HR: at 17:49

## 2020-07-24 RX ADMIN — METOCLOPRAMIDE SCH MG: 5 INJECTION, SOLUTION INTRAMUSCULAR; INTRAVENOUS at 10:48

## 2020-07-24 RX ADMIN — METOCLOPRAMIDE SCH MG: 5 INJECTION, SOLUTION INTRAMUSCULAR; INTRAVENOUS at 20:20

## 2020-07-24 RX ADMIN — METOCLOPRAMIDE SCH MG: 5 INJECTION, SOLUTION INTRAMUSCULAR; INTRAVENOUS at 02:47

## 2020-07-24 RX ADMIN — LEVOFLOXACIN SCH MLS/HR: 5 INJECTION, SOLUTION INTRAVENOUS at 10:44

## 2020-07-24 RX ADMIN — Medication SCH MMU: at 10:45

## 2020-07-24 RX ADMIN — METOCLOPRAMIDE SCH MG: 5 INJECTION, SOLUTION INTRAMUSCULAR; INTRAVENOUS at 16:09

## 2020-07-24 RX ADMIN — MINERAL OIL, PETROLATUM SCH INCH: 425; 568 OINTMENT OPHTHALMIC at 08:00

## 2020-07-24 RX ADMIN — HYDROCORTISONE SODIUM SUCCINATE SCH MG: 100 INJECTION, POWDER, FOR SOLUTION INTRAMUSCULAR; INTRAVENOUS at 20:19

## 2020-07-24 RX ADMIN — MINERAL OIL, PETROLATUM SCH INCH: 425; 568 OINTMENT OPHTHALMIC at 14:09

## 2020-07-24 RX ADMIN — MINERAL OIL, PETROLATUM SCH INCH: 425; 568 OINTMENT OPHTHALMIC at 20:20

## 2020-07-24 RX ADMIN — HYDROCORTISONE SODIUM SUCCINATE SCH MG: 100 INJECTION, POWDER, FOR SOLUTION INTRAMUSCULAR; INTRAVENOUS at 11:35

## 2020-07-24 RX ADMIN — DEXTROSE MONOHYDRATE PRN ML: 25 INJECTION, SOLUTION INTRAVENOUS at 02:50

## 2020-07-24 RX ADMIN — DEXTROSE MONOHYDRATE PRN ML: 25 INJECTION, SOLUTION INTRAVENOUS at 07:30

## 2020-07-24 RX ADMIN — Medication SCH MMU: at 20:18

## 2020-07-24 RX ADMIN — Medication PRN MLS/HR: at 07:48

## 2020-07-24 NOTE — NUR
Was not able to get a weight. Bedscale was not working.  When bed was flat wt was 53Kgs, 
when head of bed was up weight was 94Kgs.

## 2020-07-24 NOTE — NUR
Sedation vacation was conducted while performing PM Personal hygiene. PT did open eyes and 
blink to command. PT was asked to squeeze hands, wiggle fingers and toes but no movement 
noted.  Fentanyl restarted at half the previous rate so ti is not running @ 25mcg and Versed 
remains off. PT resting comfortably. Will continue to monitor.

## 2020-07-24 NOTE — NUR
Patient in room CICU 2013. I have received report from Bhavik MAGAÑA and had the opportunity to 
ask questions and assume patient care. Patient lightly sedated on ventilator, following 
commands to blink with a slight delay. Wife is at bedside encouraging him to get better. VS 
are WNL, will continue to monitor closely.

## 2020-07-24 NOTE — NUR
Problems reprioritized. Patient report given, questions answered & plan of care reviewed 
with Bhavik MAGAÑA.

## 2020-07-24 NOTE — NUR
Reassessment: TF restarted at 20 ml/hr. Has not been meeting needs since on vent. Pt now has 
relistor and reglan were added. Pt is stooling. Per MD will continue supportive care per 
family wishes. 



Recommend:

1. OGTF using vital AF at 80 ml/hr will provide 1912 ml volume, 2304 cals,

144 gram protein, 1557 ml free water. Advance toward goal rate as

tolerated.

2. additional water flush 200ml Q4

3. PALB Q M/Th; Daily weights

4. routine bowel care, prokinetic and opiod agonist antagonist

-------------------------------------------------------------------------------

Addendum: 07/24/20 at 1119 by Sherita Shirley RD

-------------------------------------------------------------------------------

Amended: Links added.

## 2020-07-25 VITALS — DIASTOLIC BLOOD PRESSURE: 47 MMHG | SYSTOLIC BLOOD PRESSURE: 115 MMHG

## 2020-07-25 VITALS — DIASTOLIC BLOOD PRESSURE: 61 MMHG | SYSTOLIC BLOOD PRESSURE: 123 MMHG

## 2020-07-25 VITALS — DIASTOLIC BLOOD PRESSURE: 54 MMHG | SYSTOLIC BLOOD PRESSURE: 118 MMHG

## 2020-07-25 VITALS — DIASTOLIC BLOOD PRESSURE: 63 MMHG | SYSTOLIC BLOOD PRESSURE: 120 MMHG

## 2020-07-25 VITALS — DIASTOLIC BLOOD PRESSURE: 69 MMHG | SYSTOLIC BLOOD PRESSURE: 124 MMHG

## 2020-07-25 VITALS — SYSTOLIC BLOOD PRESSURE: 122 MMHG | DIASTOLIC BLOOD PRESSURE: 81 MMHG

## 2020-07-25 VITALS — SYSTOLIC BLOOD PRESSURE: 117 MMHG | DIASTOLIC BLOOD PRESSURE: 56 MMHG

## 2020-07-25 VITALS — SYSTOLIC BLOOD PRESSURE: 116 MMHG | DIASTOLIC BLOOD PRESSURE: 51 MMHG

## 2020-07-25 VITALS — DIASTOLIC BLOOD PRESSURE: 68 MMHG | SYSTOLIC BLOOD PRESSURE: 125 MMHG

## 2020-07-25 VITALS — DIASTOLIC BLOOD PRESSURE: 63 MMHG | SYSTOLIC BLOOD PRESSURE: 135 MMHG

## 2020-07-25 VITALS — DIASTOLIC BLOOD PRESSURE: 64 MMHG | SYSTOLIC BLOOD PRESSURE: 127 MMHG

## 2020-07-25 VITALS — SYSTOLIC BLOOD PRESSURE: 133 MMHG | DIASTOLIC BLOOD PRESSURE: 71 MMHG

## 2020-07-25 VITALS — DIASTOLIC BLOOD PRESSURE: 58 MMHG | SYSTOLIC BLOOD PRESSURE: 132 MMHG

## 2020-07-25 VITALS — DIASTOLIC BLOOD PRESSURE: 67 MMHG | SYSTOLIC BLOOD PRESSURE: 126 MMHG

## 2020-07-25 VITALS — SYSTOLIC BLOOD PRESSURE: 121 MMHG | DIASTOLIC BLOOD PRESSURE: 58 MMHG

## 2020-07-25 VITALS — SYSTOLIC BLOOD PRESSURE: 124 MMHG | DIASTOLIC BLOOD PRESSURE: 63 MMHG

## 2020-07-25 VITALS — SYSTOLIC BLOOD PRESSURE: 133 MMHG | DIASTOLIC BLOOD PRESSURE: 63 MMHG

## 2020-07-25 VITALS — DIASTOLIC BLOOD PRESSURE: 54 MMHG | SYSTOLIC BLOOD PRESSURE: 117 MMHG

## 2020-07-25 VITALS — SYSTOLIC BLOOD PRESSURE: 131 MMHG | DIASTOLIC BLOOD PRESSURE: 72 MMHG

## 2020-07-25 VITALS — SYSTOLIC BLOOD PRESSURE: 122 MMHG | DIASTOLIC BLOOD PRESSURE: 65 MMHG

## 2020-07-25 VITALS — DIASTOLIC BLOOD PRESSURE: 53 MMHG | SYSTOLIC BLOOD PRESSURE: 114 MMHG

## 2020-07-25 VITALS — DIASTOLIC BLOOD PRESSURE: 50 MMHG | SYSTOLIC BLOOD PRESSURE: 120 MMHG

## 2020-07-25 VITALS — DIASTOLIC BLOOD PRESSURE: 65 MMHG | SYSTOLIC BLOOD PRESSURE: 126 MMHG

## 2020-07-25 VITALS — DIASTOLIC BLOOD PRESSURE: 68 MMHG | SYSTOLIC BLOOD PRESSURE: 133 MMHG

## 2020-07-25 LAB
ALBUMIN SERPL BCP-MCNC: 1.7 G/DL (ref 3.4–5)
ALBUMIN/GLOB SERPL: 0.6 {RATIO} (ref 1.1–1.5)
ALP SERPL-CCNC: 162 IU/L (ref 46–116)
ALT SERPL W P-5'-P-CCNC: 473 U/L (ref 12–78)
ANION GAP SERPL CALCULATED.3IONS-SCNC: 14 MMOL/L (ref 8–16)
ARTERIAL PATENCY WRIST A: POSITIVE
AST SERPL W P-5'-P-CCNC: 219 U/L (ref 10–37)
BASE EXCESS BLDA CALC-SCNC: -4.9 MMOL/L (ref -2–2)
BASOPHILS # BLD AUTO: 0 X10'3 (ref 0–0.2)
BASOPHILS NFR BLD AUTO: 0.2 % (ref 0–1)
BILIRUB SERPL-MCNC: 12.8 MG/DL (ref 0.1–1)
BODY TEMPERATURE: 35.8
BUN SERPL-MCNC: 65 MG/DL (ref 7–18)
BUN/CREAT SERPL: 8.6 (ref 5.4–32)
CALCIUM SERPL-MCNC: 7.7 MG/DL (ref 8.5–10.1)
CHLORIDE SERPL-SCNC: 94 MMOL/L (ref 99–107)
CO2 SERPL-SCNC: 24.4 MMOL/L (ref 24–32)
COHGB MFR BLDA: 0.5 % (ref 0–3.9)
CREAT SERPL-MCNC: 7.52 MG/DL (ref 0.6–1.1)
EOSINOPHIL # BLD AUTO: 0 X10'3 (ref 0–0.9)
EOSINOPHIL NFR BLD AUTO: 0 % (ref 0–6)
ERYTHROCYTE [DISTWIDTH] IN BLOOD BY AUTOMATED COUNT: 14.5 % (ref 11.5–14.5)
GFR SERPL CREATININE-BSD FRML MDRD: 8 ML/MIN
GLUCOSE SERPL-MCNC: 119 MG/DL (ref 70–104)
HCO3 BLDA-SCNC: 19.9 MMOL/L (ref 22–26)
HCT VFR BLD AUTO: 24.5 % (ref 42–52)
HGB BLD-MCNC: 8.4 G/DL (ref 14–17.9)
HGB BLDA-MCNC: 9.2 G/DL (ref 14–18)
LYMPHOCYTES # BLD AUTO: 0.5 X10'3 (ref 1.1–4.8)
LYMPHOCYTES NFR BLD AUTO: 4.7 % (ref 21–51)
MAGNESIUM SERPL-MCNC: 2.1 MG/DL (ref 1.5–2.4)
MCH RBC QN AUTO: 32.1 PG (ref 27–31)
MCHC RBC AUTO-ENTMCNC: 34.4 G/DL (ref 33–36.5)
MCV RBC AUTO: 93.5 FL (ref 78–98)
METHGB MFR BLDA: 0.2 % (ref 0–1.5)
MONOCYTES # BLD AUTO: 0.3 X10'3 (ref 0–0.9)
MONOCYTES NFR BLD AUTO: 3.2 % (ref 2–12)
NEUTROPHILS # BLD AUTO: 9.1 X10'3 (ref 1.8–7.7)
NEUTROPHILS NFR BLD AUTO: 91.9 % (ref 42–75)
O2/TOTAL GAS SETTING VFR VENT: 25 MMHG/%
OXYHGB MFR BLDA: 93.1 % (ref 94–97)
PCO2 TEMP ADJ BLDA: 33.9 MMHG (ref 35–48)
PEEP RESPIRATORY: 5 CM H2O
PHOSPHATE SERPL-MCNC: 7.4 MG/DL (ref 2.3–4.5)
PLATELET # BLD AUTO: 107 X10'3 (ref 140–440)
PMV BLD AUTO: 10.2 FL (ref 7.4–10.4)
PO2 TEMP ADJ BLD: 70.3 MMHG (ref 75–100)
POTASSIUM SERPL-SCNC: 4.9 MMOL/L (ref 3.5–5.1)
PROT SERPL-MCNC: 4.5 G/DL (ref 6.4–8.2)
RBC # BLD AUTO: 2.62 X10'6 (ref 4.7–6.1)
RESP RATE 1H: 18 B/MIN
SAO2 % BLDA: 93.8 % (ref 94–97)
SODIUM SERPL-SCNC: 132 MMOL/L (ref 135–145)
SPONT VT COMPRES GAS VOL SET UNCOR VENT: 450 ML
WBC # BLD AUTO: 9.9 X10'3 (ref 4.5–11)

## 2020-07-25 PROCEDURE — 5A1D70Z PERFORMANCE OF URINARY FILTRATION, INTERMITTENT, LESS THAN 6 HOURS PER DAY: ICD-10-PCS | Performed by: INTERNAL MEDICINE

## 2020-07-25 RX ADMIN — METHYLNALTREXONE BROMIDE SCH MG: 12 INJECTION, SOLUTION SUBCUTANEOUS at 08:00

## 2020-07-25 RX ADMIN — METOCLOPRAMIDE SCH MG: 5 INJECTION, SOLUTION INTRAMUSCULAR; INTRAVENOUS at 02:26

## 2020-07-25 RX ADMIN — HYDROCORTISONE SODIUM SUCCINATE SCH MG: 100 INJECTION, POWDER, FOR SOLUTION INTRAMUSCULAR; INTRAVENOUS at 07:25

## 2020-07-25 RX ADMIN — MINERAL OIL, PETROLATUM SCH INCH: 425; 568 OINTMENT OPHTHALMIC at 02:24

## 2020-07-25 RX ADMIN — Medication SCH MMU: at 20:50

## 2020-07-25 RX ADMIN — MINERAL OIL, PETROLATUM SCH INCH: 425; 568 OINTMENT OPHTHALMIC at 07:25

## 2020-07-25 RX ADMIN — METOCLOPRAMIDE SCH MG: 5 INJECTION, SOLUTION INTRAMUSCULAR; INTRAVENOUS at 13:18

## 2020-07-25 RX ADMIN — DEXTROSE SCH MLS/HR: 20 INJECTION, SOLUTION INTRAVENOUS at 20:00

## 2020-07-25 RX ADMIN — MINERAL OIL, PETROLATUM SCH INCH: 425; 568 OINTMENT OPHTHALMIC at 13:18

## 2020-07-25 RX ADMIN — DEXTROSE SCH MLS/HR: 20 INJECTION, SOLUTION INTRAVENOUS at 23:31

## 2020-07-25 RX ADMIN — HYDROCORTISONE SODIUM SUCCINATE SCH MG: 100 INJECTION, POWDER, FOR SOLUTION INTRAMUSCULAR; INTRAVENOUS at 13:18

## 2020-07-25 RX ADMIN — LEVOFLOXACIN SCH MLS/HR: 5 INJECTION, SOLUTION INTRAVENOUS at 07:25

## 2020-07-25 RX ADMIN — METOCLOPRAMIDE SCH MG: 5 INJECTION, SOLUTION INTRAMUSCULAR; INTRAVENOUS at 20:50

## 2020-07-25 RX ADMIN — METOCLOPRAMIDE SCH MG: 5 INJECTION, SOLUTION INTRAMUSCULAR; INTRAVENOUS at 07:26

## 2020-07-25 RX ADMIN — HYDROCORTISONE SODIUM SUCCINATE SCH MG: 100 INJECTION, POWDER, FOR SOLUTION INTRAMUSCULAR; INTRAVENOUS at 20:50

## 2020-07-25 RX ADMIN — DEXTROSE SCH MLS/HR: 20 INJECTION, SOLUTION INTRAVENOUS at 11:58

## 2020-07-25 RX ADMIN — MINERAL OIL, PETROLATUM SCH INCH: 425; 568 OINTMENT OPHTHALMIC at 20:50

## 2020-07-25 RX ADMIN — Medication SCH MMU: at 07:26

## 2020-07-25 RX ADMIN — HYDROCORTISONE SODIUM SUCCINATE SCH MG: 100 INJECTION, POWDER, FOR SOLUTION INTRAMUSCULAR; INTRAVENOUS at 02:26

## 2020-07-25 NOTE — NUR
Patient in room CICU 2013. I have received report from ARCHANA Wright and had the opportunity to 
ask questions and assume patient care. Patient is intubated with FiO2 at 28%, PEEP of 5, 
Respiratory rate of 20. Restraints in place, sedation is currently off. Patients wife is at 
bedside. Normal sinus rhythm on the monitor. SpO2 97%.

## 2020-07-25 NOTE — NUR
Patients eyes are open, patient blinks on command, does not track, or follow commands to 
move extremities. Patient is without startle response to eyes. Patient does respond with 
furrowed brow with repositioning and oral care. Versed and Fentanyl remain off at this time.

## 2020-07-25 NOTE — NUR
Versed and Fentanyl re started per MD orders. Patient with strong gag reflex with oral 
care, patient raising head and torso off of bed. Does not follow commands. Biting tube. 
Patient oxygen saturation decreasing to the 80's. Patient with improved comfort and 
oxygenation with sedation. See IV spread sheet for rate of Versed and Fentanyl. Will 
continue to monitor.

## 2020-07-25 NOTE — NUR
Problems reprioritized. Patient report given, questions answered & plan of care reviewed 
with Adriana MAGAÑA.

## 2020-07-26 VITALS — DIASTOLIC BLOOD PRESSURE: 72 MMHG | SYSTOLIC BLOOD PRESSURE: 131 MMHG

## 2020-07-26 VITALS — DIASTOLIC BLOOD PRESSURE: 83 MMHG | SYSTOLIC BLOOD PRESSURE: 143 MMHG

## 2020-07-26 VITALS — DIASTOLIC BLOOD PRESSURE: 84 MMHG | SYSTOLIC BLOOD PRESSURE: 145 MMHG

## 2020-07-26 VITALS — DIASTOLIC BLOOD PRESSURE: 78 MMHG | SYSTOLIC BLOOD PRESSURE: 134 MMHG

## 2020-07-26 VITALS — DIASTOLIC BLOOD PRESSURE: 83 MMHG | SYSTOLIC BLOOD PRESSURE: 126 MMHG

## 2020-07-26 VITALS — SYSTOLIC BLOOD PRESSURE: 139 MMHG | DIASTOLIC BLOOD PRESSURE: 77 MMHG

## 2020-07-26 VITALS — SYSTOLIC BLOOD PRESSURE: 133 MMHG | DIASTOLIC BLOOD PRESSURE: 76 MMHG

## 2020-07-26 VITALS — DIASTOLIC BLOOD PRESSURE: 77 MMHG | SYSTOLIC BLOOD PRESSURE: 140 MMHG

## 2020-07-26 VITALS — DIASTOLIC BLOOD PRESSURE: 81 MMHG | SYSTOLIC BLOOD PRESSURE: 135 MMHG

## 2020-07-26 VITALS — DIASTOLIC BLOOD PRESSURE: 82 MMHG | SYSTOLIC BLOOD PRESSURE: 143 MMHG

## 2020-07-26 VITALS — DIASTOLIC BLOOD PRESSURE: 74 MMHG | SYSTOLIC BLOOD PRESSURE: 133 MMHG

## 2020-07-26 VITALS — SYSTOLIC BLOOD PRESSURE: 145 MMHG | DIASTOLIC BLOOD PRESSURE: 75 MMHG

## 2020-07-26 VITALS — DIASTOLIC BLOOD PRESSURE: 78 MMHG | SYSTOLIC BLOOD PRESSURE: 136 MMHG

## 2020-07-26 VITALS — DIASTOLIC BLOOD PRESSURE: 84 MMHG | SYSTOLIC BLOOD PRESSURE: 151 MMHG

## 2020-07-26 VITALS — SYSTOLIC BLOOD PRESSURE: 134 MMHG | DIASTOLIC BLOOD PRESSURE: 69 MMHG

## 2020-07-26 VITALS — SYSTOLIC BLOOD PRESSURE: 133 MMHG | DIASTOLIC BLOOD PRESSURE: 72 MMHG

## 2020-07-26 VITALS — DIASTOLIC BLOOD PRESSURE: 76 MMHG | SYSTOLIC BLOOD PRESSURE: 138 MMHG

## 2020-07-26 VITALS — DIASTOLIC BLOOD PRESSURE: 83 MMHG | SYSTOLIC BLOOD PRESSURE: 140 MMHG

## 2020-07-26 VITALS — DIASTOLIC BLOOD PRESSURE: 80 MMHG | SYSTOLIC BLOOD PRESSURE: 149 MMHG

## 2020-07-26 VITALS — DIASTOLIC BLOOD PRESSURE: 81 MMHG | SYSTOLIC BLOOD PRESSURE: 146 MMHG

## 2020-07-26 VITALS — SYSTOLIC BLOOD PRESSURE: 154 MMHG | DIASTOLIC BLOOD PRESSURE: 87 MMHG

## 2020-07-26 VITALS — DIASTOLIC BLOOD PRESSURE: 82 MMHG | SYSTOLIC BLOOD PRESSURE: 146 MMHG

## 2020-07-26 VITALS — DIASTOLIC BLOOD PRESSURE: 86 MMHG | SYSTOLIC BLOOD PRESSURE: 150 MMHG

## 2020-07-26 LAB
ALBUMIN SERPL BCP-MCNC: 1.8 G/DL (ref 3.4–5)
ALBUMIN/GLOB SERPL: 0.6 {RATIO} (ref 1.1–1.5)
ALP SERPL-CCNC: 179 IU/L (ref 46–116)
ALT SERPL W P-5'-P-CCNC: 386 U/L (ref 12–78)
ANION GAP SERPL CALCULATED.3IONS-SCNC: 13 MMOL/L (ref 8–16)
ARTERIAL PATENCY WRIST A: POSITIVE
AST SERPL W P-5'-P-CCNC: 197 U/L (ref 10–37)
BASE EXCESS BLDA CALC-SCNC: -3.8 MMOL/L (ref -2–2)
BASOPHILS # BLD AUTO: 0 X10'3 (ref 0–0.2)
BASOPHILS NFR BLD AUTO: 0.2 % (ref 0–1)
BILIRUB SERPL-MCNC: 9.7 MG/DL (ref 0.1–1)
BODY TEMPERATURE: 36.9
BUN SERPL-MCNC: 54 MG/DL (ref 7–18)
BUN/CREAT SERPL: 9.1 (ref 5.4–32)
CALCIUM SERPL-MCNC: 7.8 MG/DL (ref 8.5–10.1)
CHLORIDE SERPL-SCNC: 96 MMOL/L (ref 99–107)
CO2 SERPL-SCNC: 24.4 MMOL/L (ref 24–32)
COHGB MFR BLDA: 0.3 % (ref 0–3.9)
CREAT SERPL-MCNC: 5.91 MG/DL (ref 0.6–1.1)
EOSINOPHIL # BLD AUTO: 0 X10'3 (ref 0–0.9)
EOSINOPHIL NFR BLD AUTO: 0 % (ref 0–6)
ERYTHROCYTE [DISTWIDTH] IN BLOOD BY AUTOMATED COUNT: 14.4 % (ref 11.5–14.5)
GFR SERPL CREATININE-BSD FRML MDRD: 11 ML/MIN
GLUCOSE SERPL-MCNC: 130 MG/DL (ref 70–104)
HCO3 BLDA-SCNC: 19.9 MMOL/L (ref 22–26)
HCT VFR BLD AUTO: 25.1 % (ref 42–52)
HGB BLD-MCNC: 8.4 G/DL (ref 14–17.9)
HGB BLDA-MCNC: 9.1 G/DL (ref 14–18)
LYMPHOCYTES # BLD AUTO: 0.7 X10'3 (ref 1.1–4.8)
LYMPHOCYTES NFR BLD AUTO: 4.9 % (ref 21–51)
MAGNESIUM SERPL-MCNC: 2.1 MG/DL (ref 1.5–2.4)
MCH RBC QN AUTO: 31.9 PG (ref 27–31)
MCHC RBC AUTO-ENTMCNC: 33.6 G/DL (ref 33–36.5)
MCV RBC AUTO: 94.9 FL (ref 78–98)
METHGB MFR BLDA: 0 % (ref 0–1.5)
MONOCYTES # BLD AUTO: 1.2 X10'3 (ref 0–0.9)
MONOCYTES NFR BLD AUTO: 8.8 % (ref 2–12)
NEUTROPHILS # BLD AUTO: 11.8 X10'3 (ref 1.8–7.7)
NEUTROPHILS NFR BLD AUTO: 86.1 % (ref 42–75)
O2/TOTAL GAS SETTING VFR VENT: 28 MMHG/%
OXYHGB MFR BLDA: 97.7 % (ref 94–97)
PCO2 TEMP ADJ BLDA: 31.1 MMHG (ref 35–48)
PEEP RESPIRATORY: 5 CM H2O
PHOSPHATE SERPL-MCNC: 5.6 MG/DL (ref 2.3–4.5)
PLATELET # BLD AUTO: 212 X10'3 (ref 140–440)
PMV BLD AUTO: 9.4 FL (ref 7.4–10.4)
PO2 TEMP ADJ BLD: 111.6 MMHG (ref 75–100)
POTASSIUM SERPL-SCNC: 4.2 MMOL/L (ref 3.5–5.1)
PROT SERPL-MCNC: 4.6 G/DL (ref 6.4–8.2)
RBC # BLD AUTO: 2.64 X10'6 (ref 4.7–6.1)
RESP RATE 1H: 18 B/MIN
SAO2 % BLDA: 98 % (ref 94–97)
SODIUM SERPL-SCNC: 133 MMOL/L (ref 135–145)
SPONT VT COMPRES GAS VOL SET UNCOR VENT: 450 ML
WBC # BLD AUTO: 13.7 X10'3 (ref 4.5–11)

## 2020-07-26 RX ADMIN — Medication PRN MLS/HR: at 11:17

## 2020-07-26 RX ADMIN — Medication SCH MMU: at 20:38

## 2020-07-26 RX ADMIN — METOCLOPRAMIDE SCH MG: 5 INJECTION, SOLUTION INTRAMUSCULAR; INTRAVENOUS at 01:50

## 2020-07-26 RX ADMIN — METOCLOPRAMIDE SCH MG: 5 INJECTION, SOLUTION INTRAMUSCULAR; INTRAVENOUS at 07:13

## 2020-07-26 RX ADMIN — LEVOFLOXACIN SCH MLS/HR: 5 INJECTION, SOLUTION INTRAVENOUS at 07:12

## 2020-07-26 RX ADMIN — HYDROCORTISONE SODIUM SUCCINATE SCH MG: 100 INJECTION, POWDER, FOR SOLUTION INTRAMUSCULAR; INTRAVENOUS at 01:50

## 2020-07-26 RX ADMIN — Medication PRN MLS/HR: at 13:03

## 2020-07-26 RX ADMIN — HYDROCORTISONE SODIUM SUCCINATE SCH MG: 100 INJECTION, POWDER, FOR SOLUTION INTRAMUSCULAR; INTRAVENOUS at 20:38

## 2020-07-26 RX ADMIN — MINERAL OIL, PETROLATUM SCH INCH: 425; 568 OINTMENT OPHTHALMIC at 20:38

## 2020-07-26 RX ADMIN — HYDROCORTISONE SODIUM SUCCINATE SCH MG: 100 INJECTION, POWDER, FOR SOLUTION INTRAMUSCULAR; INTRAVENOUS at 13:02

## 2020-07-26 RX ADMIN — METOCLOPRAMIDE SCH MG: 5 INJECTION, SOLUTION INTRAMUSCULAR; INTRAVENOUS at 13:03

## 2020-07-26 RX ADMIN — Medication SCH MMU: at 07:13

## 2020-07-26 RX ADMIN — HYDROCORTISONE SODIUM SUCCINATE SCH MG: 100 INJECTION, POWDER, FOR SOLUTION INTRAMUSCULAR; INTRAVENOUS at 07:13

## 2020-07-26 RX ADMIN — MINERAL OIL, PETROLATUM SCH INCH: 425; 568 OINTMENT OPHTHALMIC at 01:50

## 2020-07-26 RX ADMIN — MINERAL OIL, PETROLATUM SCH INCH: 425; 568 OINTMENT OPHTHALMIC at 13:03

## 2020-07-26 RX ADMIN — DEXTROSE SCH MLS/HR: 20 INJECTION, SOLUTION INTRAVENOUS at 10:36

## 2020-07-26 RX ADMIN — METOCLOPRAMIDE SCH MG: 5 INJECTION, SOLUTION INTRAMUSCULAR; INTRAVENOUS at 20:38

## 2020-07-26 RX ADMIN — MINERAL OIL, PETROLATUM SCH INCH: 425; 568 OINTMENT OPHTHALMIC at 07:12

## 2020-07-26 NOTE — NUR
Patient in room CICU 2013. I have received report from ARCHANA Wright and had the opportunity to 
ask questions and assume patient care.

## 2020-07-26 NOTE — NUR
Filemon consult: Pt continues to not tolerate EN  twice this AM w/ TF 

at 30ml/hr. 600ml rectal tube output as well past 24 hours noted. Requiring HD for KG per 
MD. Na 133; MURIEL d/w RN regarding holding free water since receiving HD if MD agreeable. Pt 
has +4 general severe edema w/ pressure area on mouth where ET is given pt edentulous 
status. No open wounds noted at this time per WOC. Pt remains full code s/p family meeting 
though poor prognosis per MD; will monitor for changes in code status as well. 

Recommend:

1. OGTF using vital AF at 80 ml/hr will provide 1912 ml volume, 2304 cals,

144 gram protein, 1557 ml free water. Advance toward goal rate as

tolerated.

2. additional water flush per intensivist; on HD

3. PALB Q M/Th; Daily weights

4. routine bowel care, prokinetic and opiod agonist antagonist

-------------------------------------------------------------------------------

Addendum: 07/26/20 at 1210 by Tal Beltran RD

-------------------------------------------------------------------------------

Amended: Links added.

## 2020-07-26 NOTE — NUR
Problems reprioritized. Patient report given, questions answered & plan of care reviewed 
with ARCHANA Wright.

## 2020-07-26 NOTE — NUR
pt has moments of agitation, wakes up, left up his head and moves his legs for few seconds 
then goes back to calm. 



Problems reprioritized. Patient report given to night shift RN, questions answered & plan of 
care reviewed with [].

## 2020-07-26 NOTE — NUR
Patient intubated and sedated. FiO2 25% on ventilator. Patients eyes open spontaneously, 
does not track or follow commands. Patient agitated during oral care, raising head and torso 
from bed, coughing, large Tidal volumes on the ventilator of 1500. Patient with furrowed 
brow and tears. Sedation bolus administered per MD order. See IV spreadsheet.

## 2020-07-26 NOTE — NUR
Patient moving left leg, attempting to raise head. Brow furrowed. Patient tachycardic at 120 
and increased respiratory rate to 26. Patient does not follow commands or track with eyes.

## 2020-07-27 VITALS — SYSTOLIC BLOOD PRESSURE: 146 MMHG | DIASTOLIC BLOOD PRESSURE: 90 MMHG

## 2020-07-27 VITALS — SYSTOLIC BLOOD PRESSURE: 125 MMHG | DIASTOLIC BLOOD PRESSURE: 76 MMHG

## 2020-07-27 VITALS — DIASTOLIC BLOOD PRESSURE: 80 MMHG | SYSTOLIC BLOOD PRESSURE: 139 MMHG

## 2020-07-27 VITALS — DIASTOLIC BLOOD PRESSURE: 84 MMHG | SYSTOLIC BLOOD PRESSURE: 134 MMHG

## 2020-07-27 VITALS — DIASTOLIC BLOOD PRESSURE: 80 MMHG | SYSTOLIC BLOOD PRESSURE: 136 MMHG

## 2020-07-27 VITALS — DIASTOLIC BLOOD PRESSURE: 77 MMHG | SYSTOLIC BLOOD PRESSURE: 132 MMHG

## 2020-07-27 VITALS — DIASTOLIC BLOOD PRESSURE: 82 MMHG | SYSTOLIC BLOOD PRESSURE: 135 MMHG

## 2020-07-27 VITALS — DIASTOLIC BLOOD PRESSURE: 86 MMHG | SYSTOLIC BLOOD PRESSURE: 143 MMHG

## 2020-07-27 VITALS — DIASTOLIC BLOOD PRESSURE: 83 MMHG | SYSTOLIC BLOOD PRESSURE: 131 MMHG

## 2020-07-27 VITALS — SYSTOLIC BLOOD PRESSURE: 139 MMHG | DIASTOLIC BLOOD PRESSURE: 83 MMHG

## 2020-07-27 VITALS — SYSTOLIC BLOOD PRESSURE: 146 MMHG | DIASTOLIC BLOOD PRESSURE: 95 MMHG

## 2020-07-27 VITALS — DIASTOLIC BLOOD PRESSURE: 83 MMHG | SYSTOLIC BLOOD PRESSURE: 132 MMHG

## 2020-07-27 VITALS — DIASTOLIC BLOOD PRESSURE: 82 MMHG | SYSTOLIC BLOOD PRESSURE: 136 MMHG

## 2020-07-27 VITALS — SYSTOLIC BLOOD PRESSURE: 141 MMHG | DIASTOLIC BLOOD PRESSURE: 85 MMHG

## 2020-07-27 VITALS — SYSTOLIC BLOOD PRESSURE: 138 MMHG | DIASTOLIC BLOOD PRESSURE: 87 MMHG

## 2020-07-27 VITALS — SYSTOLIC BLOOD PRESSURE: 140 MMHG | DIASTOLIC BLOOD PRESSURE: 84 MMHG

## 2020-07-27 VITALS — DIASTOLIC BLOOD PRESSURE: 84 MMHG | SYSTOLIC BLOOD PRESSURE: 136 MMHG

## 2020-07-27 VITALS — DIASTOLIC BLOOD PRESSURE: 85 MMHG | SYSTOLIC BLOOD PRESSURE: 131 MMHG

## 2020-07-27 VITALS — SYSTOLIC BLOOD PRESSURE: 134 MMHG | DIASTOLIC BLOOD PRESSURE: 82 MMHG

## 2020-07-27 VITALS — SYSTOLIC BLOOD PRESSURE: 137 MMHG | DIASTOLIC BLOOD PRESSURE: 76 MMHG

## 2020-07-27 VITALS — DIASTOLIC BLOOD PRESSURE: 87 MMHG | SYSTOLIC BLOOD PRESSURE: 140 MMHG

## 2020-07-27 VITALS — SYSTOLIC BLOOD PRESSURE: 137 MMHG | DIASTOLIC BLOOD PRESSURE: 87 MMHG

## 2020-07-27 VITALS — DIASTOLIC BLOOD PRESSURE: 86 MMHG | SYSTOLIC BLOOD PRESSURE: 146 MMHG

## 2020-07-27 VITALS — SYSTOLIC BLOOD PRESSURE: 139 MMHG | DIASTOLIC BLOOD PRESSURE: 87 MMHG

## 2020-07-27 LAB
ALBUMIN SERPL BCP-MCNC: 1.6 G/DL (ref 3.4–5)
ALBUMIN/GLOB SERPL: 0.5 {RATIO} (ref 1.1–1.5)
ALP SERPL-CCNC: 172 IU/L (ref 46–116)
ALT SERPL W P-5'-P-CCNC: 308 U/L (ref 12–78)
ANION GAP SERPL CALCULATED.3IONS-SCNC: 10 MMOL/L (ref 8–16)
ARTERIAL PATENCY WRIST A: POSITIVE
AST SERPL W P-5'-P-CCNC: 165 U/L (ref 10–37)
BASE EXCESS BLDA CALC-SCNC: -4 MMOL/L (ref -2–2)
BASOPHILS # BLD AUTO: 0.1 X10'3 (ref 0–0.2)
BASOPHILS NFR BLD AUTO: 0.4 % (ref 0–1)
BILIRUB SERPL-MCNC: 7.3 MG/DL (ref 0.1–1)
BODY TEMPERATURE: 36.9
BUN SERPL-MCNC: 83 MG/DL (ref 7–18)
BUN/CREAT SERPL: 10.9 (ref 5.4–32)
CALCIUM SERPL-MCNC: 7.4 MG/DL (ref 8.5–10.1)
CHLORIDE SERPL-SCNC: 94 MMOL/L (ref 99–107)
CO2 SERPL-SCNC: 23.9 MMOL/L (ref 24–32)
COHGB MFR BLDA: 0.3 % (ref 0–3.9)
CREAT SERPL-MCNC: 7.61 MG/DL (ref 0.6–1.1)
EOSINOPHIL # BLD AUTO: 0 X10'3 (ref 0–0.9)
EOSINOPHIL NFR BLD AUTO: 0 % (ref 0–6)
ERYTHROCYTE [DISTWIDTH] IN BLOOD BY AUTOMATED COUNT: 14.1 % (ref 11.5–14.5)
GFR SERPL CREATININE-BSD FRML MDRD: 8 ML/MIN
GLUCOSE SERPL-MCNC: 107 MG/DL (ref 70–104)
HCO3 BLDA-SCNC: 20.1 MMOL/L (ref 22–26)
HCT VFR BLD AUTO: 23.6 % (ref 42–52)
HGB BLD-MCNC: 8 G/DL (ref 14–17.9)
HGB BLDA-MCNC: 8.8 G/DL (ref 14–18)
LYMPHOCYTES # BLD AUTO: 0.8 X10'3 (ref 1.1–4.8)
LYMPHOCYTES NFR BLD AUTO: 6 % (ref 21–51)
MAGNESIUM SERPL-MCNC: 2.3 MG/DL (ref 1.5–2.4)
MCH RBC QN AUTO: 32 PG (ref 27–31)
MCHC RBC AUTO-ENTMCNC: 33.8 G/DL (ref 33–36.5)
MCV RBC AUTO: 94.9 FL (ref 78–98)
METHGB MFR BLDA: 0.2 % (ref 0–1.5)
MONOCYTES # BLD AUTO: 1.2 X10'3 (ref 0–0.9)
MONOCYTES NFR BLD AUTO: 9.1 % (ref 2–12)
NEUTROPHILS # BLD AUTO: 11.3 X10'3 (ref 1.8–7.7)
NEUTROPHILS NFR BLD AUTO: 84.5 % (ref 42–75)
O2/TOTAL GAS SETTING VFR VENT: 25 MMHG/%
OXYHGB MFR BLDA: 96.9 % (ref 94–97)
PCO2 TEMP ADJ BLDA: 32.7 MMHG (ref 35–48)
PEEP RESPIRATORY: 5 CM H2O
PHOSPHATE SERPL-MCNC: 7.8 MG/DL (ref 2.3–4.5)
PLATELET # BLD AUTO: 257 X10'3 (ref 140–440)
PMV BLD AUTO: 8.8 FL (ref 7.4–10.4)
PO2 TEMP ADJ BLD: 99.8 MMHG (ref 75–100)
POTASSIUM SERPL-SCNC: 4.7 MMOL/L (ref 3.5–5.1)
PREALB SERPL-MCNC: 14.5 MG/DL (ref 19–36)
PROT SERPL-MCNC: 4.6 G/DL (ref 6.4–8.2)
RBC # BLD AUTO: 2.49 X10'6 (ref 4.7–6.1)
RESP RATE 1H: 18 B/MIN
SAO2 % BLDA: 97.4 % (ref 94–97)
SODIUM SERPL-SCNC: 128 MMOL/L (ref 135–145)
SPONT VT COMPRES GAS VOL SET UNCOR VENT: 450 ML
WBC # BLD AUTO: 13.4 X10'3 (ref 4.5–11)

## 2020-07-27 RX ADMIN — DEXTROSE SCH MLS/HR: 20 INJECTION, SOLUTION INTRAVENOUS at 12:00

## 2020-07-27 RX ADMIN — MINERAL OIL, PETROLATUM SCH INCH: 425; 568 OINTMENT OPHTHALMIC at 02:30

## 2020-07-27 RX ADMIN — LEVOFLOXACIN SCH MLS/HR: 5 INJECTION, SOLUTION INTRAVENOUS at 07:29

## 2020-07-27 RX ADMIN — Medication SCH MMU: at 07:29

## 2020-07-27 RX ADMIN — MINERAL OIL, PETROLATUM SCH INCH: 425; 568 OINTMENT OPHTHALMIC at 20:52

## 2020-07-27 RX ADMIN — METOCLOPRAMIDE SCH MG: 5 INJECTION, SOLUTION INTRAMUSCULAR; INTRAVENOUS at 07:29

## 2020-07-27 RX ADMIN — HYDROCORTISONE SODIUM SUCCINATE SCH MG: 100 INJECTION, POWDER, FOR SOLUTION INTRAMUSCULAR; INTRAVENOUS at 02:29

## 2020-07-27 RX ADMIN — DEXTROSE SCH MLS/HR: 20 INJECTION, SOLUTION INTRAVENOUS at 02:00

## 2020-07-27 RX ADMIN — METHYLNALTREXONE BROMIDE SCH MG: 12 INJECTION, SOLUTION SUBCUTANEOUS at 07:29

## 2020-07-27 RX ADMIN — ERYTHROMYCIN SCH MG: 250 TABLET, FILM COATED ORAL at 12:57

## 2020-07-27 RX ADMIN — MINERAL OIL, PETROLATUM SCH INCH: 425; 568 OINTMENT OPHTHALMIC at 07:30

## 2020-07-27 RX ADMIN — Medication SCH MMU: at 20:52

## 2020-07-27 RX ADMIN — MINERAL OIL, PETROLATUM SCH INCH: 425; 568 OINTMENT OPHTHALMIC at 13:55

## 2020-07-27 RX ADMIN — ERYTHROMYCIN SCH MG: 250 TABLET, FILM COATED ORAL at 20:52

## 2020-07-27 RX ADMIN — HYDROCORTISONE SODIUM SUCCINATE SCH MG: 100 INJECTION, POWDER, FOR SOLUTION INTRAMUSCULAR; INTRAVENOUS at 20:52

## 2020-07-27 RX ADMIN — Medication PRN MLS/HR: at 04:30

## 2020-07-27 RX ADMIN — Medication PRN MLS/HR: at 10:17

## 2020-07-27 RX ADMIN — METOCLOPRAMIDE SCH MG: 5 INJECTION, SOLUTION INTRAMUSCULAR; INTRAVENOUS at 02:29

## 2020-07-27 RX ADMIN — Medication PRN MLS/HR: at 02:39

## 2020-07-27 RX ADMIN — DEXTROSE SCH MLS/HR: 20 INJECTION, SOLUTION INTRAVENOUS at 16:05

## 2020-07-27 RX ADMIN — HYDROCORTISONE SODIUM SUCCINATE SCH MG: 100 INJECTION, POWDER, FOR SOLUTION INTRAMUSCULAR; INTRAVENOUS at 13:16

## 2020-07-27 RX ADMIN — HYDROCORTISONE SODIUM SUCCINATE SCH MG: 100 INJECTION, POWDER, FOR SOLUTION INTRAMUSCULAR; INTRAVENOUS at 07:29

## 2020-07-27 NOTE — NUR
Follow up: Patient still having delayed gastric empting. GRV up to 650 ml. Patient's tube 
feeding is at 30 ml/hr. Have not been able to meet nutrient needs since intubation on 7/16. 
He is receiving Relistor and Reglan. Pt is stooling. Discussed above with MD. Per MD pt will 
start on erythromycin to stimulate gastric movement and will attempt post pyloric tube 
placement, MD ordered a KUB to check to any possible impaction.

## 2020-07-27 NOTE — NUR
Patient in room CICU 2013. I have received report from Art, RN and had the opportunity to 
ask questions and assume patient care.

## 2020-07-27 NOTE — NUR
Follow up: Patient still having delayed gastric empting. GRV up to 650 ml. Patient's tube 
feeding is at 30 ml/hr. Have not been able to meet nutrient needs since intubation on 7/16. 
He is receiving Relistor and Reglan. Pt is stooling. Discussed above with MD. Per MD pt will 
start on erythromycin to stimulate gastric movement and will attempt post pyloric tube 
placement, MD ordered a KUB to check to any possible impaction. 

-------------------------------------------------------------------------------

Addendum: 07/27/20 at 1210 by [address]ita [address] RD

-------------------------------------------------------------------------------

Amended: Links added.

## 2020-07-27 NOTE — NUR
Central line tubing changed. 

-------------------------------------------------------------------------------

Addendum: 07/27/20 at 1459 by Gavin Singh III, RN

-------------------------------------------------------------------------------

Central line dressing was changed, not the tubing.

## 2020-07-27 NOTE — NUR
Problems reprioritized. Patient report given, questions answered & plan of care reviewed 
with Art, RN.

## 2020-07-28 VITALS — SYSTOLIC BLOOD PRESSURE: 138 MMHG | DIASTOLIC BLOOD PRESSURE: 81 MMHG

## 2020-07-28 VITALS — DIASTOLIC BLOOD PRESSURE: 76 MMHG | SYSTOLIC BLOOD PRESSURE: 138 MMHG

## 2020-07-28 VITALS — DIASTOLIC BLOOD PRESSURE: 75 MMHG | SYSTOLIC BLOOD PRESSURE: 137 MMHG

## 2020-07-28 VITALS — DIASTOLIC BLOOD PRESSURE: 80 MMHG | SYSTOLIC BLOOD PRESSURE: 142 MMHG

## 2020-07-28 VITALS — DIASTOLIC BLOOD PRESSURE: 85 MMHG | SYSTOLIC BLOOD PRESSURE: 139 MMHG

## 2020-07-28 VITALS — SYSTOLIC BLOOD PRESSURE: 136 MMHG | DIASTOLIC BLOOD PRESSURE: 71 MMHG

## 2020-07-28 VITALS — SYSTOLIC BLOOD PRESSURE: 120 MMHG | DIASTOLIC BLOOD PRESSURE: 73 MMHG

## 2020-07-28 VITALS — SYSTOLIC BLOOD PRESSURE: 138 MMHG | DIASTOLIC BLOOD PRESSURE: 78 MMHG

## 2020-07-28 VITALS — SYSTOLIC BLOOD PRESSURE: 117 MMHG | DIASTOLIC BLOOD PRESSURE: 72 MMHG

## 2020-07-28 VITALS — DIASTOLIC BLOOD PRESSURE: 81 MMHG | SYSTOLIC BLOOD PRESSURE: 144 MMHG

## 2020-07-28 VITALS — DIASTOLIC BLOOD PRESSURE: 79 MMHG | SYSTOLIC BLOOD PRESSURE: 143 MMHG

## 2020-07-28 VITALS — SYSTOLIC BLOOD PRESSURE: 150 MMHG | DIASTOLIC BLOOD PRESSURE: 90 MMHG

## 2020-07-28 VITALS — SYSTOLIC BLOOD PRESSURE: 128 MMHG | DIASTOLIC BLOOD PRESSURE: 85 MMHG

## 2020-07-28 VITALS — SYSTOLIC BLOOD PRESSURE: 149 MMHG | DIASTOLIC BLOOD PRESSURE: 86 MMHG

## 2020-07-28 VITALS — SYSTOLIC BLOOD PRESSURE: 131 MMHG | DIASTOLIC BLOOD PRESSURE: 80 MMHG

## 2020-07-28 VITALS — SYSTOLIC BLOOD PRESSURE: 121 MMHG | DIASTOLIC BLOOD PRESSURE: 73 MMHG

## 2020-07-28 VITALS — SYSTOLIC BLOOD PRESSURE: 140 MMHG | DIASTOLIC BLOOD PRESSURE: 82 MMHG

## 2020-07-28 VITALS — DIASTOLIC BLOOD PRESSURE: 82 MMHG | SYSTOLIC BLOOD PRESSURE: 147 MMHG

## 2020-07-28 VITALS — DIASTOLIC BLOOD PRESSURE: 75 MMHG | SYSTOLIC BLOOD PRESSURE: 119 MMHG

## 2020-07-28 VITALS — SYSTOLIC BLOOD PRESSURE: 135 MMHG | DIASTOLIC BLOOD PRESSURE: 75 MMHG

## 2020-07-28 VITALS — SYSTOLIC BLOOD PRESSURE: 140 MMHG | DIASTOLIC BLOOD PRESSURE: 83 MMHG

## 2020-07-28 VITALS — SYSTOLIC BLOOD PRESSURE: 121 MMHG | DIASTOLIC BLOOD PRESSURE: 82 MMHG

## 2020-07-28 VITALS — DIASTOLIC BLOOD PRESSURE: 81 MMHG | SYSTOLIC BLOOD PRESSURE: 138 MMHG

## 2020-07-28 VITALS — DIASTOLIC BLOOD PRESSURE: 88 MMHG | SYSTOLIC BLOOD PRESSURE: 146 MMHG

## 2020-07-28 LAB
ALBUMIN SERPL BCP-MCNC: 1.6 G/DL (ref 3.4–5)
ALBUMIN/GLOB SERPL: 0.6 {RATIO} (ref 1.1–1.5)
ALP SERPL-CCNC: 173 IU/L (ref 46–116)
ALT SERPL W P-5'-P-CCNC: 244 U/L (ref 12–78)
ANION GAP SERPL CALCULATED.3IONS-SCNC: 11 MMOL/L (ref 8–16)
ARTERIAL PATENCY WRIST A: POSITIVE
AST SERPL W P-5'-P-CCNC: 130 U/L (ref 10–37)
BASE EXCESS BLDA CALC-SCNC: -5.9 MMOL/L (ref -2–2)
BASOPHILS # BLD AUTO: 0 X10'3 (ref 0–0.2)
BASOPHILS NFR BLD AUTO: 0.1 % (ref 0–1)
BILIRUB SERPL-MCNC: 6.2 MG/DL (ref 0.1–1)
BODY TEMPERATURE: 36.9
BUN SERPL-MCNC: 121 MG/DL (ref 7–18)
BUN/CREAT SERPL: 12.9 (ref 5.4–32)
CALCIUM SERPL-MCNC: 7.3 MG/DL (ref 8.5–10.1)
CHLORIDE SERPL-SCNC: 90 MMOL/L (ref 99–107)
CO2 SERPL-SCNC: 21.6 MMOL/L (ref 24–32)
COHGB MFR BLDA: 0.3 % (ref 0–3.9)
CREAT SERPL-MCNC: 9.4 MG/DL (ref 0.6–1.1)
EOSINOPHIL # BLD AUTO: 0 X10'3 (ref 0–0.9)
EOSINOPHIL NFR BLD AUTO: 0 % (ref 0–6)
ERYTHROCYTE [DISTWIDTH] IN BLOOD BY AUTOMATED COUNT: 14.7 % (ref 11.5–14.5)
GFR SERPL CREATININE-BSD FRML MDRD: 6 ML/MIN
GLUCOSE SERPL-MCNC: 160 MG/DL (ref 70–104)
HCO3 BLDA-SCNC: 18.1 MMOL/L (ref 22–26)
HCT VFR BLD AUTO: 23.7 % (ref 42–52)
HGB BLD-MCNC: 8 G/DL (ref 14–17.9)
HGB BLDA-MCNC: 8.5 G/DL (ref 14–18)
LYMPHOCYTES # BLD AUTO: 0.4 X10'3 (ref 1.1–4.8)
LYMPHOCYTES NFR BLD AUTO: 3 % (ref 21–51)
MAGNESIUM SERPL-MCNC: 2.6 MG/DL (ref 1.5–2.4)
MCH RBC QN AUTO: 32.1 PG (ref 27–31)
MCHC RBC AUTO-ENTMCNC: 33.7 G/DL (ref 33–36.5)
MCV RBC AUTO: 95.2 FL (ref 78–98)
METHGB MFR BLDA: 0.1 % (ref 0–1.5)
MONOCYTES # BLD AUTO: 1.3 X10'3 (ref 0–0.9)
MONOCYTES NFR BLD AUTO: 9.3 % (ref 2–12)
NEUTROPHILS # BLD AUTO: 12.5 X10'3 (ref 1.8–7.7)
NEUTROPHILS NFR BLD AUTO: 87.6 % (ref 42–75)
O2/TOTAL GAS SETTING VFR VENT: 25 MMHG/%
OXYHGB MFR BLDA: 97.6 % (ref 94–97)
PCO2 TEMP ADJ BLDA: 30 MMHG (ref 35–48)
PEEP RESPIRATORY: 5 CM H2O
PHOSPHATE SERPL-MCNC: 8.9 MG/DL (ref 2.3–4.5)
PLATELET # BLD AUTO: 261 X10'3 (ref 140–440)
PMV BLD AUTO: 8.5 FL (ref 7.4–10.4)
PO2 TEMP ADJ BLD: 113.8 MMHG (ref 75–100)
POTASSIUM SERPL-SCNC: 4.8 MMOL/L (ref 3.5–5.1)
PROT SERPL-MCNC: 4.5 G/DL (ref 6.4–8.2)
RBC # BLD AUTO: 2.49 X10'6 (ref 4.7–6.1)
SAO2 % BLDA: 98 % (ref 94–97)
SODIUM SERPL-SCNC: 123 MMOL/L (ref 135–145)
WBC # BLD AUTO: 14.2 X10'3 (ref 4.5–11)

## 2020-07-28 PROCEDURE — 5A1D70Z PERFORMANCE OF URINARY FILTRATION, INTERMITTENT, LESS THAN 6 HOURS PER DAY: ICD-10-PCS | Performed by: INTERNAL MEDICINE

## 2020-07-28 RX ADMIN — Medication PRN MLS/HR: at 01:02

## 2020-07-28 RX ADMIN — Medication SCH MMU: at 20:25

## 2020-07-28 RX ADMIN — HYDROCORTISONE SODIUM SUCCINATE SCH MG: 100 INJECTION, POWDER, FOR SOLUTION INTRAMUSCULAR; INTRAVENOUS at 02:05

## 2020-07-28 RX ADMIN — Medication SCH MMU: at 07:40

## 2020-07-28 RX ADMIN — LEVOFLOXACIN SCH MLS/HR: 5 INJECTION, SOLUTION INTRAVENOUS at 07:39

## 2020-07-28 RX ADMIN — DEXTROSE SCH MLS/HR: 20 INJECTION, SOLUTION INTRAVENOUS at 07:40

## 2020-07-28 RX ADMIN — MINERAL OIL, PETROLATUM SCH INCH: 425; 568 OINTMENT OPHTHALMIC at 02:05

## 2020-07-28 RX ADMIN — MINERAL OIL, PETROLATUM SCH INCH: 425; 568 OINTMENT OPHTHALMIC at 20:25

## 2020-07-28 RX ADMIN — ERYTHROMYCIN SCH MG: 250 TABLET, FILM COATED ORAL at 20:24

## 2020-07-28 RX ADMIN — Medication PRN MLS/HR: at 12:55

## 2020-07-28 RX ADMIN — DEXTROSE SCH MLS/HR: 20 INJECTION, SOLUTION INTRAVENOUS at 02:25

## 2020-07-28 RX ADMIN — HYDROCORTISONE SODIUM SUCCINATE SCH MG: 100 INJECTION, POWDER, FOR SOLUTION INTRAMUSCULAR; INTRAVENOUS at 20:25

## 2020-07-28 RX ADMIN — HYDROCORTISONE SODIUM SUCCINATE SCH MG: 100 INJECTION, POWDER, FOR SOLUTION INTRAMUSCULAR; INTRAVENOUS at 07:39

## 2020-07-28 RX ADMIN — ERYTHROMYCIN SCH MG: 250 TABLET, FILM COATED ORAL at 08:00

## 2020-07-28 RX ADMIN — MINERAL OIL, PETROLATUM SCH INCH: 425; 568 OINTMENT OPHTHALMIC at 07:38

## 2020-07-28 RX ADMIN — Medication PRN MLS/HR: at 03:46

## 2020-07-28 RX ADMIN — Medication PRN MLS/HR: at 21:50

## 2020-07-28 NOTE — NUR
Friend, Maggy, called and attempted to inquire about pt's plane of care. Friend was 
informed that nurse was unable to talk to her due to HIPPA. Friend continued to then accuse 
pt's wife of being abusive and diabetic, then threatened to call [facility]PS. Nurse once again told 
friend she was unable to speak to her.

## 2020-07-28 NOTE — NUR
Patient in room CICU 2013. I have received report from ARCHANA Martinez and had the opportunity to 
ask questions and assume patient care.

## 2020-07-29 VITALS — DIASTOLIC BLOOD PRESSURE: 81 MMHG | SYSTOLIC BLOOD PRESSURE: 134 MMHG

## 2020-07-29 VITALS — SYSTOLIC BLOOD PRESSURE: 139 MMHG | DIASTOLIC BLOOD PRESSURE: 80 MMHG

## 2020-07-29 VITALS — SYSTOLIC BLOOD PRESSURE: 137 MMHG | DIASTOLIC BLOOD PRESSURE: 87 MMHG

## 2020-07-29 VITALS — DIASTOLIC BLOOD PRESSURE: 81 MMHG | SYSTOLIC BLOOD PRESSURE: 149 MMHG

## 2020-07-29 VITALS — SYSTOLIC BLOOD PRESSURE: 141 MMHG | DIASTOLIC BLOOD PRESSURE: 81 MMHG

## 2020-07-29 VITALS — SYSTOLIC BLOOD PRESSURE: 143 MMHG | DIASTOLIC BLOOD PRESSURE: 82 MMHG

## 2020-07-29 VITALS — DIASTOLIC BLOOD PRESSURE: 77 MMHG | SYSTOLIC BLOOD PRESSURE: 149 MMHG

## 2020-07-29 VITALS — SYSTOLIC BLOOD PRESSURE: 131 MMHG | DIASTOLIC BLOOD PRESSURE: 81 MMHG

## 2020-07-29 VITALS — SYSTOLIC BLOOD PRESSURE: 140 MMHG | DIASTOLIC BLOOD PRESSURE: 86 MMHG

## 2020-07-29 VITALS — SYSTOLIC BLOOD PRESSURE: 152 MMHG | DIASTOLIC BLOOD PRESSURE: 89 MMHG

## 2020-07-29 VITALS — DIASTOLIC BLOOD PRESSURE: 79 MMHG | SYSTOLIC BLOOD PRESSURE: 145 MMHG

## 2020-07-29 VITALS — SYSTOLIC BLOOD PRESSURE: 138 MMHG | DIASTOLIC BLOOD PRESSURE: 79 MMHG

## 2020-07-29 VITALS — DIASTOLIC BLOOD PRESSURE: 76 MMHG | SYSTOLIC BLOOD PRESSURE: 128 MMHG

## 2020-07-29 VITALS — DIASTOLIC BLOOD PRESSURE: 77 MMHG | SYSTOLIC BLOOD PRESSURE: 133 MMHG

## 2020-07-29 VITALS — SYSTOLIC BLOOD PRESSURE: 128 MMHG | DIASTOLIC BLOOD PRESSURE: 69 MMHG

## 2020-07-29 VITALS — DIASTOLIC BLOOD PRESSURE: 76 MMHG | SYSTOLIC BLOOD PRESSURE: 134 MMHG

## 2020-07-29 VITALS — DIASTOLIC BLOOD PRESSURE: 80 MMHG | SYSTOLIC BLOOD PRESSURE: 133 MMHG

## 2020-07-29 VITALS — DIASTOLIC BLOOD PRESSURE: 90 MMHG | SYSTOLIC BLOOD PRESSURE: 154 MMHG

## 2020-07-29 VITALS — SYSTOLIC BLOOD PRESSURE: 151 MMHG | DIASTOLIC BLOOD PRESSURE: 86 MMHG

## 2020-07-29 VITALS — DIASTOLIC BLOOD PRESSURE: 71 MMHG | SYSTOLIC BLOOD PRESSURE: 130 MMHG

## 2020-07-29 VITALS — SYSTOLIC BLOOD PRESSURE: 137 MMHG | DIASTOLIC BLOOD PRESSURE: 72 MMHG

## 2020-07-29 VITALS — SYSTOLIC BLOOD PRESSURE: 151 MMHG | DIASTOLIC BLOOD PRESSURE: 85 MMHG

## 2020-07-29 VITALS — DIASTOLIC BLOOD PRESSURE: 77 MMHG | SYSTOLIC BLOOD PRESSURE: 135 MMHG

## 2020-07-29 VITALS — DIASTOLIC BLOOD PRESSURE: 82 MMHG | SYSTOLIC BLOOD PRESSURE: 150 MMHG

## 2020-07-29 VITALS — DIASTOLIC BLOOD PRESSURE: 81 MMHG | SYSTOLIC BLOOD PRESSURE: 139 MMHG

## 2020-07-29 LAB
ALBUMIN SERPL BCP-MCNC: 1.5 G/DL (ref 3.4–5)
ALBUMIN/GLOB SERPL: 0.5 {RATIO} (ref 1.1–1.5)
ALP SERPL-CCNC: 156 IU/L (ref 46–116)
ALT SERPL W P-5'-P-CCNC: 197 U/L (ref 12–78)
ANION GAP SERPL CALCULATED.3IONS-SCNC: 11 MMOL/L (ref 8–16)
APTT PPP: 44 SECONDS (ref 22–32)
ARTERIAL PATENCY WRIST A: POSITIVE
AST SERPL W P-5'-P-CCNC: 124 U/L (ref 10–37)
BASE EXCESS BLDA CALC-SCNC: -3.4 MMOL/L (ref -2–2)
BASOPHILS # BLD AUTO: 0.1 X10'3 (ref 0–0.2)
BASOPHILS NFR BLD AUTO: 0.4 % (ref 0–1)
BILIRUB SERPL-MCNC: 5.1 MG/DL (ref 0.1–1)
BODY TEMPERATURE: 36.5
BUN SERPL-MCNC: 92 MG/DL (ref 7–18)
BUN/CREAT SERPL: 13 (ref 5.4–32)
CALCIUM SERPL-MCNC: 7 MG/DL (ref 8.5–10.1)
CHLORIDE SERPL-SCNC: 97 MMOL/L (ref 99–107)
CO2 SERPL-SCNC: 22.4 MMOL/L (ref 24–32)
COHGB MFR BLDA: 0.4 % (ref 0–3.9)
CREAT SERPL-MCNC: 7.06 MG/DL (ref 0.6–1.1)
EOSINOPHIL # BLD AUTO: 0 X10'3 (ref 0–0.9)
EOSINOPHIL NFR BLD AUTO: 0 % (ref 0–6)
ERYTHROCYTE [DISTWIDTH] IN BLOOD BY AUTOMATED COUNT: 15.1 % (ref 11.5–14.5)
ERYTHROCYTE [DISTWIDTH] IN BLOOD BY AUTOMATED COUNT: 15.6 % (ref 11.5–14.5)
GFR SERPL CREATININE-BSD FRML MDRD: 9 ML/MIN
GLUCOSE SERPL-MCNC: 112 MG/DL (ref 70–104)
HCO3 BLDA-SCNC: 20.5 MMOL/L (ref 22–26)
HCT VFR BLD AUTO: 20.6 % (ref 42–52)
HCT VFR BLD AUTO: 21 % (ref 42–52)
HGB BLD-MCNC: 7 G/DL (ref 14–17.9)
HGB BLD-MCNC: 7.2 G/DL (ref 14–17.9)
HGB BLDA-MCNC: 8 G/DL (ref 14–18)
LYMPHOCYTES # BLD AUTO: 0.3 X10'3 (ref 1.1–4.8)
LYMPHOCYTES NFR BLD AUTO: 2.3 % (ref 21–51)
MAGNESIUM SERPL-MCNC: 2.3 MG/DL (ref 1.5–2.4)
MCH RBC QN AUTO: 32 PG (ref 27–31)
MCH RBC QN AUTO: 32.7 PG (ref 27–31)
MCHC RBC AUTO-ENTMCNC: 33.8 G/DL (ref 33–36.5)
MCHC RBC AUTO-ENTMCNC: 34.3 G/DL (ref 33–36.5)
MCV RBC AUTO: 94.9 FL (ref 78–98)
MCV RBC AUTO: 95.5 FL (ref 78–98)
METHGB MFR BLDA: 0 % (ref 0–1.5)
MONOCYTES # BLD AUTO: 1.4 X10'3 (ref 0–0.9)
MONOCYTES NFR BLD AUTO: 9.7 % (ref 2–12)
NEUTROPHILS # BLD AUTO: 12.9 X10'3 (ref 1.8–7.7)
NEUTROPHILS NFR BLD AUTO: 87.6 % (ref 42–75)
O2/TOTAL GAS SETTING VFR VENT: 21 MMHG/%
OXYHGB MFR BLDA: 95.5 % (ref 94–97)
PCO2 TEMP ADJ BLDA: 31.2 MMHG (ref 35–48)
PEEP RESPIRATORY: 5 CM H2O
PHOSPHATE SERPL-MCNC: 8.1 MG/DL (ref 2.3–4.5)
PLATELET # BLD AUTO: 340 X10'3 (ref 140–440)
PLATELET # BLD AUTO: 361 X10'3 (ref 140–440)
PMV BLD AUTO: 8.2 FL (ref 7.4–10.4)
PMV BLD AUTO: 8.3 FL (ref 7.4–10.4)
PO2 TEMP ADJ BLD: 86.3 MMHG (ref 75–100)
POTASSIUM SERPL-SCNC: 4.8 MMOL/L (ref 3.5–5.1)
PROT SERPL-MCNC: 4.3 G/DL (ref 6.4–8.2)
RBC # BLD AUTO: 2.17 X10'6 (ref 4.7–6.1)
RBC # BLD AUTO: 2.2 X10'6 (ref 4.7–6.1)
RESP RATE 1H: 18 B/MIN
SAO2 % BLDA: 95.9 % (ref 94–97)
SODIUM SERPL-SCNC: 130 MMOL/L (ref 135–145)
SPONT VT COMPRES GAS VOL SET UNCOR VENT: 450 ML
WBC # BLD AUTO: 14.8 X10'3 (ref 4.5–11)
WBC # BLD AUTO: 15.4 X10'3 (ref 4.5–11)

## 2020-07-29 RX ADMIN — Medication SCH MMU: at 08:45

## 2020-07-29 RX ADMIN — HYDROCORTISONE SODIUM SUCCINATE SCH MG: 100 INJECTION, POWDER, FOR SOLUTION INTRAMUSCULAR; INTRAVENOUS at 03:54

## 2020-07-29 RX ADMIN — MINERAL OIL, PETROLATUM SCH INCH: 425; 568 OINTMENT OPHTHALMIC at 14:04

## 2020-07-29 RX ADMIN — HEPARIN SODIUM SCH UNIT: 5000 INJECTION, SOLUTION INTRAVENOUS; SUBCUTANEOUS at 20:14

## 2020-07-29 RX ADMIN — METHYLNALTREXONE BROMIDE SCH MG: 12 INJECTION, SOLUTION SUBCUTANEOUS at 06:35

## 2020-07-29 RX ADMIN — HYDROCORTISONE SODIUM SUCCINATE SCH MG: 100 INJECTION, POWDER, FOR SOLUTION INTRAMUSCULAR; INTRAVENOUS at 14:04

## 2020-07-29 RX ADMIN — METHYLNALTREXONE BROMIDE SCH MG: 12 INJECTION, SOLUTION SUBCUTANEOUS at 08:46

## 2020-07-29 RX ADMIN — Medication PRN MLS/HR: at 16:32

## 2020-07-29 RX ADMIN — HYDROCORTISONE SODIUM SUCCINATE SCH MG: 100 INJECTION, POWDER, FOR SOLUTION INTRAMUSCULAR; INTRAVENOUS at 20:13

## 2020-07-29 RX ADMIN — MINERAL OIL, PETROLATUM SCH INCH: 425; 568 OINTMENT OPHTHALMIC at 08:44

## 2020-07-29 RX ADMIN — Medication SCH MMU: at 20:14

## 2020-07-29 RX ADMIN — MINERAL OIL, PETROLATUM SCH INCH: 425; 568 OINTMENT OPHTHALMIC at 02:00

## 2020-07-29 RX ADMIN — Medication PRN MLS/HR: at 21:38

## 2020-07-29 RX ADMIN — DEXTROSE SCH MLS/HR: 20 INJECTION, SOLUTION INTRAVENOUS at 08:45

## 2020-07-29 RX ADMIN — HYDROCORTISONE SODIUM SUCCINATE SCH MG: 100 INJECTION, POWDER, FOR SOLUTION INTRAMUSCULAR; INTRAVENOUS at 08:45

## 2020-07-29 RX ADMIN — MINERAL OIL, PETROLATUM SCH INCH: 425; 568 OINTMENT OPHTHALMIC at 20:14

## 2020-07-29 RX ADMIN — LEVOFLOXACIN SCH MLS/HR: 5 INJECTION, SOLUTION INTRAVENOUS at 08:45

## 2020-07-29 RX ADMIN — ERYTHROMYCIN SCH MG: 250 TABLET, FILM COATED ORAL at 08:45

## 2020-07-29 NOTE — NUR
NOC intensivist aware of Hgb 7.0 and Hct 20.6. Pt currently observed to be asymptomatic with 
no signs of distress. Repeat lab ordered to be taken 0600.

## 2020-07-29 NOTE — NUR
Follow up: Patient's GRV now under 25 ml. Tube feeding almost at goal 

rate, currently at 70 ml/hr. Has rectal tube, having moderate liquid 

stools. Receiving erythromycin, reglan, relistor. Feeding tube tip was 

advanced further into stomach by RN. Receiving HD and is s/p TDC placement.

Recommend:

1. OGTF using vital AF at 80 ml/hr will provide 1912 ml volume, 2304 cals,

144 gram protein, 1557 ml free water. Advance toward goal rate as

tolerated.

2. additional water flush per intensivist; on HD

3. PALB Q M/Th; Daily weights

4. routine bowel care, prokinetic and opiod agonist antagonist

-------------------------------------------------------------------------------

Addendum: 07/29/20 at 1114 [address]ita [address] RD

-------------------------------------------------------------------------------

Amended: Links added.

## 2020-07-29 NOTE — NUR
Patient in room CICU 2013. I have received report from Karis MAGAÑA and had the opportunity to 
ask questions and assume patient care.

## 2020-07-30 VITALS — SYSTOLIC BLOOD PRESSURE: 134 MMHG | DIASTOLIC BLOOD PRESSURE: 75 MMHG

## 2020-07-30 VITALS — SYSTOLIC BLOOD PRESSURE: 127 MMHG | DIASTOLIC BLOOD PRESSURE: 73 MMHG

## 2020-07-30 VITALS — DIASTOLIC BLOOD PRESSURE: 78 MMHG | SYSTOLIC BLOOD PRESSURE: 137 MMHG

## 2020-07-30 VITALS — DIASTOLIC BLOOD PRESSURE: 76 MMHG | SYSTOLIC BLOOD PRESSURE: 126 MMHG

## 2020-07-30 VITALS — SYSTOLIC BLOOD PRESSURE: 142 MMHG | DIASTOLIC BLOOD PRESSURE: 78 MMHG

## 2020-07-30 VITALS — SYSTOLIC BLOOD PRESSURE: 128 MMHG | DIASTOLIC BLOOD PRESSURE: 73 MMHG

## 2020-07-30 VITALS — SYSTOLIC BLOOD PRESSURE: 138 MMHG | DIASTOLIC BLOOD PRESSURE: 76 MMHG

## 2020-07-30 VITALS — DIASTOLIC BLOOD PRESSURE: 73 MMHG | SYSTOLIC BLOOD PRESSURE: 128 MMHG

## 2020-07-30 VITALS — SYSTOLIC BLOOD PRESSURE: 133 MMHG | DIASTOLIC BLOOD PRESSURE: 78 MMHG

## 2020-07-30 VITALS — SYSTOLIC BLOOD PRESSURE: 141 MMHG | DIASTOLIC BLOOD PRESSURE: 82 MMHG

## 2020-07-30 VITALS — DIASTOLIC BLOOD PRESSURE: 80 MMHG | SYSTOLIC BLOOD PRESSURE: 132 MMHG

## 2020-07-30 VITALS — DIASTOLIC BLOOD PRESSURE: 84 MMHG | SYSTOLIC BLOOD PRESSURE: 128 MMHG

## 2020-07-30 VITALS — DIASTOLIC BLOOD PRESSURE: 74 MMHG | SYSTOLIC BLOOD PRESSURE: 132 MMHG

## 2020-07-30 VITALS — DIASTOLIC BLOOD PRESSURE: 66 MMHG | SYSTOLIC BLOOD PRESSURE: 142 MMHG

## 2020-07-30 VITALS — DIASTOLIC BLOOD PRESSURE: 83 MMHG | SYSTOLIC BLOOD PRESSURE: 144 MMHG

## 2020-07-30 VITALS — DIASTOLIC BLOOD PRESSURE: 81 MMHG | SYSTOLIC BLOOD PRESSURE: 127 MMHG

## 2020-07-30 VITALS — DIASTOLIC BLOOD PRESSURE: 75 MMHG | SYSTOLIC BLOOD PRESSURE: 131 MMHG

## 2020-07-30 VITALS — SYSTOLIC BLOOD PRESSURE: 139 MMHG | DIASTOLIC BLOOD PRESSURE: 88 MMHG

## 2020-07-30 VITALS — DIASTOLIC BLOOD PRESSURE: 81 MMHG | SYSTOLIC BLOOD PRESSURE: 141 MMHG

## 2020-07-30 VITALS — SYSTOLIC BLOOD PRESSURE: 131 MMHG | DIASTOLIC BLOOD PRESSURE: 77 MMHG

## 2020-07-30 VITALS — DIASTOLIC BLOOD PRESSURE: 82 MMHG | SYSTOLIC BLOOD PRESSURE: 146 MMHG

## 2020-07-30 VITALS — SYSTOLIC BLOOD PRESSURE: 152 MMHG | DIASTOLIC BLOOD PRESSURE: 82 MMHG

## 2020-07-30 VITALS — SYSTOLIC BLOOD PRESSURE: 133 MMHG | DIASTOLIC BLOOD PRESSURE: 84 MMHG

## 2020-07-30 LAB
ALBUMIN SERPL BCP-MCNC: 1.6 G/DL (ref 3.4–5)
ALBUMIN/GLOB SERPL: 0.6 {RATIO} (ref 1.1–1.5)
ALP SERPL-CCNC: 158 IU/L (ref 46–116)
ALT SERPL W P-5'-P-CCNC: 188 U/L (ref 12–78)
ANION GAP SERPL CALCULATED.3IONS-SCNC: 13 MMOL/L (ref 8–16)
ARTERIAL PATENCY WRIST A: POSITIVE
AST SERPL W P-5'-P-CCNC: 118 U/L (ref 10–37)
BASE EXCESS BLDA CALC-SCNC: -6.6 MMOL/L (ref -2–2)
BASOPHILS # BLD AUTO: 0 X10'3 (ref 0–0.2)
BASOPHILS NFR BLD AUTO: 0.3 % (ref 0–1)
BILIRUB SERPL-MCNC: 4.5 MG/DL (ref 0.1–1)
BODY TEMPERATURE: 36.8
BUN SERPL-MCNC: 133 MG/DL (ref 7–18)
BUN/CREAT SERPL: 14.8 (ref 5.4–32)
CALCIUM SERPL-MCNC: 7.4 MG/DL (ref 8.5–10.1)
CHLORIDE SERPL-SCNC: 96 MMOL/L (ref 99–107)
CO2 SERPL-SCNC: 21 MMOL/L (ref 24–32)
COHGB MFR BLDA: 0.3 % (ref 0–3.9)
CREAT SERPL-MCNC: 9.01 MG/DL (ref 0.6–1.1)
EOSINOPHIL # BLD AUTO: 0 X10'3 (ref 0–0.9)
EOSINOPHIL NFR BLD AUTO: 0 % (ref 0–6)
ERYTHROCYTE [DISTWIDTH] IN BLOOD BY AUTOMATED COUNT: 17.4 % (ref 11.5–14.5)
GFR SERPL CREATININE-BSD FRML MDRD: 7 ML/MIN
GLUCOSE SERPL-MCNC: 130 MG/DL (ref 70–104)
HCO3 BLDA-SCNC: 17.8 MMOL/L (ref 22–26)
HCT VFR BLD AUTO: 22.5 % (ref 42–52)
HGB BLD-MCNC: 7.6 G/DL (ref 14–17.9)
HGB BLDA-MCNC: 8.4 G/DL (ref 14–18)
LYMPHOCYTES # BLD AUTO: 0.4 X10'3 (ref 1.1–4.8)
LYMPHOCYTES NFR BLD AUTO: 2.8 % (ref 21–51)
MAGNESIUM SERPL-MCNC: 2.6 MG/DL (ref 1.5–2.4)
MCH RBC QN AUTO: 32 PG (ref 27–31)
MCHC RBC AUTO-ENTMCNC: 33.9 G/DL (ref 33–36.5)
MCV RBC AUTO: 94.4 FL (ref 78–98)
METHGB MFR BLDA: 0.2 % (ref 0–1.5)
MONOCYTES # BLD AUTO: 1 X10'3 (ref 0–0.9)
MONOCYTES NFR BLD AUTO: 7.1 % (ref 2–12)
NEUTROPHILS # BLD AUTO: 13 X10'3 (ref 1.8–7.7)
NEUTROPHILS NFR BLD AUTO: 89.8 % (ref 42–75)
O2/TOTAL GAS SETTING VFR VENT: 21 MMHG/%
OXYHGB MFR BLDA: 95.2 % (ref 94–97)
PCO2 TEMP ADJ BLDA: 30.6 MMHG (ref 35–48)
PEEP RESPIRATORY: 5 CM H2O
PHOSPHATE SERPL-MCNC: 9.8 MG/DL (ref 2.3–4.5)
PLATELET # BLD AUTO: 340 X10'3 (ref 140–440)
PMV BLD AUTO: 8.4 FL (ref 7.4–10.4)
PO2 TEMP ADJ BLD: 89.8 MMHG (ref 75–100)
POTASSIUM SERPL-SCNC: 5.1 MMOL/L (ref 3.5–5.1)
PREALB SERPL-MCNC: 20.3 MG/DL (ref 19–36)
PROT SERPL-MCNC: 4.4 G/DL (ref 6.4–8.2)
RBC # BLD AUTO: 2.38 X10'6 (ref 4.7–6.1)
RESP RATE 1H: 18 B/MIN
SAO2 % BLDA: 95.7 % (ref 94–97)
SODIUM SERPL-SCNC: 130 MMOL/L (ref 135–145)
SPONT VT COMPRES GAS VOL SET UNCOR VENT: 450 ML
WBC # BLD AUTO: 14.4 X10'3 (ref 4.5–11)

## 2020-07-30 PROCEDURE — 4A00X4Z MEASUREMENT OF CENTRAL NERVOUS ELECTRICAL ACTIVITY, EXTERNAL APPROACH: ICD-10-PCS | Performed by: INTERNAL MEDICINE

## 2020-07-30 PROCEDURE — 5A1D70Z PERFORMANCE OF URINARY FILTRATION, INTERMITTENT, LESS THAN 6 HOURS PER DAY: ICD-10-PCS | Performed by: INTERNAL MEDICINE

## 2020-07-30 RX ADMIN — HYDROCORTISONE SODIUM SUCCINATE SCH MG: 100 INJECTION, POWDER, FOR SOLUTION INTRAMUSCULAR; INTRAVENOUS at 13:46

## 2020-07-30 RX ADMIN — HYDROCORTISONE SODIUM SUCCINATE SCH MG: 100 INJECTION, POWDER, FOR SOLUTION INTRAMUSCULAR; INTRAVENOUS at 02:02

## 2020-07-30 RX ADMIN — HYDROCORTISONE SODIUM SUCCINATE SCH MG: 100 INJECTION, POWDER, FOR SOLUTION INTRAMUSCULAR; INTRAVENOUS at 07:18

## 2020-07-30 RX ADMIN — Medication SCH MMU: at 20:46

## 2020-07-30 RX ADMIN — MINERAL OIL, PETROLATUM SCH INCH: 425; 568 OINTMENT OPHTHALMIC at 13:47

## 2020-07-30 RX ADMIN — HEPARIN SODIUM SCH UNIT: 5000 INJECTION, SOLUTION INTRAVENOUS; SUBCUTANEOUS at 07:19

## 2020-07-30 RX ADMIN — Medication PRN MLS/HR: at 07:26

## 2020-07-30 RX ADMIN — MINERAL OIL, PETROLATUM SCH INCH: 425; 568 OINTMENT OPHTHALMIC at 20:46

## 2020-07-30 RX ADMIN — MINERAL OIL, PETROLATUM SCH INCH: 425; 568 OINTMENT OPHTHALMIC at 02:02

## 2020-07-30 RX ADMIN — MINERAL OIL, PETROLATUM SCH INCH: 425; 568 OINTMENT OPHTHALMIC at 07:20

## 2020-07-30 RX ADMIN — Medication PRN MLS/HR: at 22:51

## 2020-07-30 RX ADMIN — HYDROCORTISONE SODIUM SUCCINATE SCH MG: 100 INJECTION, POWDER, FOR SOLUTION INTRAMUSCULAR; INTRAVENOUS at 20:45

## 2020-07-30 RX ADMIN — Medication SCH MMU: at 07:19

## 2020-07-30 RX ADMIN — HEPARIN SODIUM SCH UNIT: 5000 INJECTION, SOLUTION INTRAVENOUS; SUBCUTANEOUS at 20:45

## 2020-07-30 NOTE — NUR
Problems reprioritized. Patient report given, questions answered & plan of care reviewed 
with Mookie MAGAÑA.

## 2020-07-30 NOTE — NUR
Patient in room CICU 2013. I have received report from Jacob MAGAÑA and Mireya Childs RN and had the 
opportunity to ask questions and assume patient care. Patient sedated, on ventilator and VS 
are WNL, will continue to monitor.

## 2020-07-30 NOTE — NUR
Problems reprioritized. Patient report given, questions answered & plan of care reviewed 
with Jacob MAGAÑA.

## 2020-07-31 VITALS — DIASTOLIC BLOOD PRESSURE: 83 MMHG | SYSTOLIC BLOOD PRESSURE: 140 MMHG

## 2020-07-31 VITALS — DIASTOLIC BLOOD PRESSURE: 80 MMHG | SYSTOLIC BLOOD PRESSURE: 150 MMHG

## 2020-07-31 VITALS — DIASTOLIC BLOOD PRESSURE: 79 MMHG | SYSTOLIC BLOOD PRESSURE: 138 MMHG

## 2020-07-31 VITALS — DIASTOLIC BLOOD PRESSURE: 85 MMHG | SYSTOLIC BLOOD PRESSURE: 131 MMHG

## 2020-07-31 VITALS — SYSTOLIC BLOOD PRESSURE: 144 MMHG | DIASTOLIC BLOOD PRESSURE: 85 MMHG

## 2020-07-31 VITALS — DIASTOLIC BLOOD PRESSURE: 88 MMHG | SYSTOLIC BLOOD PRESSURE: 131 MMHG

## 2020-07-31 VITALS — DIASTOLIC BLOOD PRESSURE: 78 MMHG | SYSTOLIC BLOOD PRESSURE: 125 MMHG

## 2020-07-31 VITALS — DIASTOLIC BLOOD PRESSURE: 86 MMHG | SYSTOLIC BLOOD PRESSURE: 146 MMHG

## 2020-07-31 VITALS — SYSTOLIC BLOOD PRESSURE: 146 MMHG | DIASTOLIC BLOOD PRESSURE: 86 MMHG

## 2020-07-31 VITALS — DIASTOLIC BLOOD PRESSURE: 86 MMHG | SYSTOLIC BLOOD PRESSURE: 142 MMHG

## 2020-07-31 VITALS — SYSTOLIC BLOOD PRESSURE: 141 MMHG | DIASTOLIC BLOOD PRESSURE: 83 MMHG

## 2020-07-31 VITALS — DIASTOLIC BLOOD PRESSURE: 84 MMHG | SYSTOLIC BLOOD PRESSURE: 140 MMHG

## 2020-07-31 VITALS — DIASTOLIC BLOOD PRESSURE: 86 MMHG | SYSTOLIC BLOOD PRESSURE: 141 MMHG

## 2020-07-31 VITALS — SYSTOLIC BLOOD PRESSURE: 142 MMHG | DIASTOLIC BLOOD PRESSURE: 76 MMHG

## 2020-07-31 VITALS — DIASTOLIC BLOOD PRESSURE: 79 MMHG | SYSTOLIC BLOOD PRESSURE: 141 MMHG

## 2020-07-31 VITALS — SYSTOLIC BLOOD PRESSURE: 140 MMHG | DIASTOLIC BLOOD PRESSURE: 84 MMHG

## 2020-07-31 VITALS — DIASTOLIC BLOOD PRESSURE: 96 MMHG | SYSTOLIC BLOOD PRESSURE: 126 MMHG

## 2020-07-31 VITALS — DIASTOLIC BLOOD PRESSURE: 89 MMHG | SYSTOLIC BLOOD PRESSURE: 142 MMHG

## 2020-07-31 VITALS — SYSTOLIC BLOOD PRESSURE: 146 MMHG | DIASTOLIC BLOOD PRESSURE: 77 MMHG

## 2020-07-31 VITALS — SYSTOLIC BLOOD PRESSURE: 136 MMHG | DIASTOLIC BLOOD PRESSURE: 90 MMHG

## 2020-07-31 VITALS — DIASTOLIC BLOOD PRESSURE: 85 MMHG | SYSTOLIC BLOOD PRESSURE: 144 MMHG

## 2020-07-31 VITALS — DIASTOLIC BLOOD PRESSURE: 81 MMHG | SYSTOLIC BLOOD PRESSURE: 147 MMHG

## 2020-07-31 LAB
ALBUMIN SERPL BCP-MCNC: 1.6 G/DL (ref 3.4–5)
ALBUMIN/GLOB SERPL: 0.6 {RATIO} (ref 1.1–1.5)
ALP SERPL-CCNC: 163 IU/L (ref 46–116)
ALT SERPL W P-5'-P-CCNC: 181 U/L (ref 12–78)
ANION GAP SERPL CALCULATED.3IONS-SCNC: 12 MMOL/L (ref 8–16)
ARTERIAL PATENCY WRIST A: POSITIVE
AST SERPL W P-5'-P-CCNC: 104 U/L (ref 10–37)
BACTERIA URNS QL MICRO: (no result) /HPF
BASE EXCESS BLDA CALC-SCNC: -6.1 MMOL/L (ref -2–2)
BASOPHILS # BLD AUTO: 0.1 X10'3 (ref 0–0.2)
BASOPHILS NFR BLD AUTO: 0.4 % (ref 0–1)
BILIRUB SERPL-MCNC: 3.6 MG/DL (ref 0.1–1)
BODY TEMPERATURE: 36.7
BUN SERPL-MCNC: 110 MG/DL (ref 7–18)
BUN/CREAT SERPL: 15.3 (ref 5.4–32)
CALCIUM SERPL-MCNC: 7.5 MG/DL (ref 8.5–10.1)
CHLORIDE SERPL-SCNC: 98 MMOL/L (ref 99–107)
CLARITY UR: (no result)
CO2 SERPL-SCNC: 23.6 MMOL/L (ref 24–32)
COHGB MFR BLDA: 0.7 % (ref 0–3.9)
COLOR UR: YELLOW
CREAT SERPL-MCNC: 7.17 MG/DL (ref 0.6–1.1)
DEPRECATED SQUAMOUS URNS QL MICRO: (no result) /LPF
EOSINOPHIL # BLD AUTO: 0 X10'3 (ref 0–0.9)
EOSINOPHIL NFR BLD AUTO: 0 % (ref 0–6)
ERYTHROCYTE [DISTWIDTH] IN BLOOD BY AUTOMATED COUNT: 18.1 % (ref 11.5–14.5)
GFR SERPL CREATININE-BSD FRML MDRD: 9 ML/MIN
GLUCOSE SERPL-MCNC: 119 MG/DL (ref 70–104)
GLUCOSE UR STRIP-MCNC: NEGATIVE MG/DL
HCO3 BLDA-SCNC: 17.8 MMOL/L (ref 22–26)
HCT VFR BLD AUTO: 22.5 % (ref 42–52)
HGB BLD-MCNC: 7.7 G/DL (ref 14–17.9)
HGB BLDA-MCNC: 8.8 G/DL (ref 14–18)
HGB UR QL STRIP: (no result)
KETONES UR STRIP-MCNC: NEGATIVE MG/DL
LEUKOCYTE ESTERASE UR QL STRIP: (no result)
LYMPHOCYTES # BLD AUTO: 0.4 X10'3 (ref 1.1–4.8)
LYMPHOCYTES NFR BLD AUTO: 2.4 % (ref 21–51)
MAGNESIUM SERPL-MCNC: 2.3 MG/DL (ref 1.5–2.4)
MCH RBC QN AUTO: 32.8 PG (ref 27–31)
MCHC RBC AUTO-ENTMCNC: 34.3 G/DL (ref 33–36.5)
MCV RBC AUTO: 95.4 FL (ref 78–98)
METHGB MFR BLDA: 0.3 % (ref 0–1.5)
MONOCYTES # BLD AUTO: 1.2 X10'3 (ref 0–0.9)
MONOCYTES NFR BLD AUTO: 8 % (ref 2–12)
MUCOUS THREADS URNS QL MICRO: (no result) /LPF
NEUTROPHILS # BLD AUTO: 13.7 X10'3 (ref 1.8–7.7)
NEUTROPHILS NFR BLD AUTO: 89.2 % (ref 42–75)
NITRITE UR QL STRIP: NEGATIVE
O2/TOTAL GAS SETTING VFR VENT: 21 MMHG/%
OXYHGB MFR BLDA: 96.1 % (ref 94–97)
PCO2 TEMP ADJ BLDA: 28.8 MMHG (ref 35–48)
PEEP RESPIRATORY: 5 CM H2O
PH UR STRIP: 6.5 [PH] (ref 4.8–8)
PHOSPHATE SERPL-MCNC: 9.2 MG/DL (ref 2.3–4.5)
PLATELET # BLD AUTO: 332 X10'3 (ref 140–440)
PMV BLD AUTO: 8 FL (ref 7.4–10.4)
PO2 TEMP ADJ BLD: 101.7 MMHG (ref 75–100)
POTASSIUM SERPL-SCNC: 4.7 MMOL/L (ref 3.5–5.1)
PROT SERPL-MCNC: 4.5 G/DL (ref 6.4–8.2)
PROT UR QL STRIP: 30 MG/DL
RBC # BLD AUTO: 2.36 X10'6 (ref 4.7–6.1)
RBC #/AREA URNS HPF: (no result) /HPF (ref 0–2)
RESP RATE 1H: 14 B/MIN
SAO2 % BLDA: 97.1 % (ref 94–97)
SODIUM SERPL-SCNC: 134 MMOL/L (ref 135–145)
SP GR UR STRIP: 1.01 (ref 1–1.03)
SPONT VT COMPRES GAS VOL SET UNCOR VENT: 550 ML
TRANS CELLS URNS QL MICRO: (no result) /HPF
URN COLLECT METHOD CLASS: (no result)
UROBILINOGEN UR STRIP-MCNC: 0.2 E.U/DL (ref 0.2–1)
WBC # BLD AUTO: 15.4 X10'3 (ref 4.5–11)
WBC #/AREA URNS HPF: (no result) /HPF (ref 0–4)

## 2020-07-31 PROCEDURE — 5A1D70Z PERFORMANCE OF URINARY FILTRATION, INTERMITTENT, LESS THAN 6 HOURS PER DAY: ICD-10-PCS | Performed by: INTERNAL MEDICINE

## 2020-07-31 RX ADMIN — HEPARIN SODIUM SCH UNIT: 5000 INJECTION, SOLUTION INTRAVENOUS; SUBCUTANEOUS at 20:32

## 2020-07-31 RX ADMIN — HYDROCORTISONE SODIUM SUCCINATE SCH MG: 100 INJECTION, POWDER, FOR SOLUTION INTRAMUSCULAR; INTRAVENOUS at 07:45

## 2020-07-31 RX ADMIN — HYDROCORTISONE SODIUM SUCCINATE SCH MG: 100 INJECTION, POWDER, FOR SOLUTION INTRAMUSCULAR; INTRAVENOUS at 14:14

## 2020-07-31 RX ADMIN — HEPARIN SODIUM SCH UNIT: 5000 INJECTION, SOLUTION INTRAVENOUS; SUBCUTANEOUS at 07:45

## 2020-07-31 RX ADMIN — MINERAL OIL, PETROLATUM SCH INCH: 425; 568 OINTMENT OPHTHALMIC at 20:00

## 2020-07-31 RX ADMIN — MINERAL OIL, PETROLATUM SCH INCH: 425; 568 OINTMENT OPHTHALMIC at 01:53

## 2020-07-31 RX ADMIN — MINERAL OIL, PETROLATUM SCH INCH: 425; 568 OINTMENT OPHTHALMIC at 13:21

## 2020-07-31 RX ADMIN — HYDROCORTISONE SODIUM SUCCINATE SCH MG: 100 INJECTION, POWDER, FOR SOLUTION INTRAMUSCULAR; INTRAVENOUS at 01:52

## 2020-07-31 RX ADMIN — METHYLNALTREXONE BROMIDE SCH MG: 12 INJECTION, SOLUTION SUBCUTANEOUS at 07:45

## 2020-07-31 RX ADMIN — HYDROCORTISONE SODIUM SUCCINATE SCH MG: 100 INJECTION, POWDER, FOR SOLUTION INTRAMUSCULAR; INTRAVENOUS at 20:32

## 2020-07-31 RX ADMIN — Medication SCH MMU: at 20:00

## 2020-07-31 RX ADMIN — MINERAL OIL, PETROLATUM SCH INCH: 425; 568 OINTMENT OPHTHALMIC at 07:45

## 2020-07-31 RX ADMIN — Medication SCH MMU: at 07:45

## 2020-07-31 NOTE — NUR
Reassessment: patient was extubated. Tube feeds off. Pending BSS. Has 

clear liquid diet. Will follow. 

 

Recommend:

1. Diet advancement as medically indicated to regular texture per SLP recs

2. routine bowel care

3. wt per rx

-------------------------------------------------------------------------------

Addendum: 07/31/20 at 1417 [address] RD

-------------------------------------------------------------------------------

Amended: Links added.

## 2020-08-01 VITALS — SYSTOLIC BLOOD PRESSURE: 134 MMHG | DIASTOLIC BLOOD PRESSURE: 70 MMHG

## 2020-08-01 VITALS — SYSTOLIC BLOOD PRESSURE: 139 MMHG | DIASTOLIC BLOOD PRESSURE: 82 MMHG

## 2020-08-01 VITALS — SYSTOLIC BLOOD PRESSURE: 154 MMHG | DIASTOLIC BLOOD PRESSURE: 78 MMHG

## 2020-08-01 VITALS — SYSTOLIC BLOOD PRESSURE: 144 MMHG | DIASTOLIC BLOOD PRESSURE: 72 MMHG

## 2020-08-01 VITALS — SYSTOLIC BLOOD PRESSURE: 146 MMHG | DIASTOLIC BLOOD PRESSURE: 86 MMHG

## 2020-08-01 VITALS — SYSTOLIC BLOOD PRESSURE: 142 MMHG | DIASTOLIC BLOOD PRESSURE: 82 MMHG

## 2020-08-01 VITALS — SYSTOLIC BLOOD PRESSURE: 139 MMHG | DIASTOLIC BLOOD PRESSURE: 78 MMHG

## 2020-08-01 VITALS — DIASTOLIC BLOOD PRESSURE: 77 MMHG | SYSTOLIC BLOOD PRESSURE: 127 MMHG

## 2020-08-01 VITALS — DIASTOLIC BLOOD PRESSURE: 80 MMHG | SYSTOLIC BLOOD PRESSURE: 143 MMHG

## 2020-08-01 VITALS — DIASTOLIC BLOOD PRESSURE: 83 MMHG | SYSTOLIC BLOOD PRESSURE: 147 MMHG

## 2020-08-01 VITALS — DIASTOLIC BLOOD PRESSURE: 84 MMHG | SYSTOLIC BLOOD PRESSURE: 151 MMHG

## 2020-08-01 VITALS — DIASTOLIC BLOOD PRESSURE: 77 MMHG | SYSTOLIC BLOOD PRESSURE: 148 MMHG

## 2020-08-01 VITALS — DIASTOLIC BLOOD PRESSURE: 72 MMHG | SYSTOLIC BLOOD PRESSURE: 144 MMHG

## 2020-08-01 VITALS — DIASTOLIC BLOOD PRESSURE: 75 MMHG | SYSTOLIC BLOOD PRESSURE: 138 MMHG

## 2020-08-01 VITALS — DIASTOLIC BLOOD PRESSURE: 87 MMHG | SYSTOLIC BLOOD PRESSURE: 133 MMHG

## 2020-08-01 VITALS — SYSTOLIC BLOOD PRESSURE: 148 MMHG | DIASTOLIC BLOOD PRESSURE: 81 MMHG

## 2020-08-01 VITALS — SYSTOLIC BLOOD PRESSURE: 128 MMHG | DIASTOLIC BLOOD PRESSURE: 77 MMHG

## 2020-08-01 VITALS — DIASTOLIC BLOOD PRESSURE: 82 MMHG | SYSTOLIC BLOOD PRESSURE: 152 MMHG

## 2020-08-01 VITALS — DIASTOLIC BLOOD PRESSURE: 70 MMHG | SYSTOLIC BLOOD PRESSURE: 141 MMHG

## 2020-08-01 VITALS — DIASTOLIC BLOOD PRESSURE: 78 MMHG | SYSTOLIC BLOOD PRESSURE: 136 MMHG

## 2020-08-01 VITALS — DIASTOLIC BLOOD PRESSURE: 75 MMHG | SYSTOLIC BLOOD PRESSURE: 130 MMHG

## 2020-08-01 VITALS — DIASTOLIC BLOOD PRESSURE: 83 MMHG | SYSTOLIC BLOOD PRESSURE: 138 MMHG

## 2020-08-01 VITALS — SYSTOLIC BLOOD PRESSURE: 117 MMHG | DIASTOLIC BLOOD PRESSURE: 79 MMHG

## 2020-08-01 VITALS — DIASTOLIC BLOOD PRESSURE: 64 MMHG | SYSTOLIC BLOOD PRESSURE: 139 MMHG

## 2020-08-01 LAB
ALBUMIN SERPL BCP-MCNC: 1.7 G/DL (ref 3.4–5)
ALBUMIN/GLOB SERPL: 0.6 {RATIO} (ref 1.1–1.5)
ALP SERPL-CCNC: 139 IU/L (ref 46–116)
ALT SERPL W P-5'-P-CCNC: 164 U/L (ref 12–78)
ANION GAP SERPL CALCULATED.3IONS-SCNC: 12 MMOL/L (ref 8–16)
ANISOCYTOSIS BLD QL SMEAR: (no result)
APTT PPP: 39 SECONDS (ref 22–32)
AST SERPL W P-5'-P-CCNC: 103 U/L (ref 10–37)
BASOPHILS # BLD AUTO: 0 X10'3 (ref 0–0.2)
BASOPHILS NFR BLD AUTO: 0.3 % (ref 0–1)
BILIRUB SERPL-MCNC: 3.4 MG/DL (ref 0.1–1)
BUN SERPL-MCNC: 84 MG/DL (ref 7–18)
BUN/CREAT SERPL: 14.8 (ref 5.4–32)
CALCIUM SERPL-MCNC: 7.7 MG/DL (ref 8.5–10.1)
CHLORIDE SERPL-SCNC: 99 MMOL/L (ref 99–107)
CO2 SERPL-SCNC: 22.7 MMOL/L (ref 24–32)
CREAT SERPL-MCNC: 5.69 MG/DL (ref 0.6–1.1)
EOSINOPHIL # BLD AUTO: 0 X10'3 (ref 0–0.9)
EOSINOPHIL NFR BLD AUTO: 0 % (ref 0–6)
ERYTHROCYTE [DISTWIDTH] IN BLOOD BY AUTOMATED COUNT: 18.7 % (ref 11.5–14.5)
ERYTHROCYTE [DISTWIDTH] IN BLOOD BY AUTOMATED COUNT: 18.8 % (ref 11.5–14.5)
ERYTHROCYTE [DISTWIDTH] IN BLOOD BY AUTOMATED COUNT: 19 % (ref 11.5–14.5)
GFR SERPL CREATININE-BSD FRML MDRD: 11 ML/MIN
GLUCOSE SERPL-MCNC: 95 MG/DL (ref 70–104)
HCT VFR BLD AUTO: 21.4 % (ref 42–52)
HCT VFR BLD AUTO: 22 % (ref 42–52)
HCT VFR BLD AUTO: 23.2 % (ref 42–52)
HGB BLD-MCNC: 7.4 G/DL (ref 14–17.9)
HGB BLD-MCNC: 7.6 G/DL (ref 14–17.9)
HGB BLD-MCNC: 7.9 G/DL (ref 14–17.9)
LYMPHOCYTES # BLD AUTO: 0.4 X10'3 (ref 1.1–4.8)
LYMPHOCYTES NFR BLD AUTO: 2.5 % (ref 21–51)
MACROCYTES BLD QL SMEAR: (no result)
MAGNESIUM SERPL-MCNC: 2.2 MG/DL (ref 1.5–2.4)
MCH RBC QN AUTO: 32.8 PG (ref 27–31)
MCH RBC QN AUTO: 33.3 PG (ref 27–31)
MCH RBC QN AUTO: 33.3 PG (ref 27–31)
MCHC RBC AUTO-ENTMCNC: 34.1 G/DL (ref 33–36.5)
MCHC RBC AUTO-ENTMCNC: 34.6 G/DL (ref 33–36.5)
MCHC RBC AUTO-ENTMCNC: 34.6 G/DL (ref 33–36.5)
MCV RBC AUTO: 96.1 FL (ref 78–98)
MCV RBC AUTO: 96.1 FL (ref 78–98)
MCV RBC AUTO: 96.3 FL (ref 78–98)
MONOCYTES # BLD AUTO: 1.3 X10'3 (ref 0–0.9)
MONOCYTES NFR BLD AUTO: 8.1 % (ref 2–12)
NEUTROPHILS # BLD AUTO: 14.6 X10'3 (ref 1.8–7.7)
NEUTROPHILS NFR BLD AUTO: 89.1 % (ref 42–75)
PHOSPHATE SERPL-MCNC: 7.1 MG/DL (ref 2.3–4.5)
PLATELET # BLD AUTO: 337 X10'3 (ref 140–440)
PLATELET # BLD AUTO: 346 X10'3 (ref 140–440)
PLATELET # BLD AUTO: 365 X10'3 (ref 140–440)
PLATELET BLD QL SMEAR: NORMAL
PMV BLD AUTO: 7.8 FL (ref 7.4–10.4)
PMV BLD AUTO: 8 FL (ref 7.4–10.4)
PMV BLD AUTO: 8.7 FL (ref 7.4–10.4)
POLYCHROMASIA BLD QL SMEAR: (no result)
POTASSIUM SERPL-SCNC: 4.6 MMOL/L (ref 3.5–5.1)
PROT SERPL-MCNC: 4.5 G/DL (ref 6.4–8.2)
RBC # BLD AUTO: 2.22 X10'6 (ref 4.7–6.1)
RBC # BLD AUTO: 2.28 X10'6 (ref 4.7–6.1)
RBC # BLD AUTO: 2.41 X10'6 (ref 4.7–6.1)
RBC MORPH BLD: (no result)
SODIUM SERPL-SCNC: 134 MMOL/L (ref 135–145)
TARGETS BLD QL SMEAR: (no result)
WBC # BLD AUTO: 14.6 X10'3 (ref 4.5–11)
WBC # BLD AUTO: 15.3 X10'3 (ref 4.5–11)
WBC # BLD AUTO: 16.4 X10'3 (ref 4.5–11)

## 2020-08-01 RX ADMIN — HYDROCORTISONE SODIUM SUCCINATE SCH MG: 100 INJECTION, POWDER, FOR SOLUTION INTRAMUSCULAR; INTRAVENOUS at 01:34

## 2020-08-01 RX ADMIN — MORPHINE SULFATE PRN MG: 4 INJECTION, SOLUTION INTRAMUSCULAR; INTRAVENOUS at 19:20

## 2020-08-01 RX ADMIN — Medication SCH MMU: at 10:51

## 2020-08-01 RX ADMIN — HYDROCORTISONE SODIUM SUCCINATE SCH MG: 100 INJECTION, POWDER, FOR SOLUTION INTRAMUSCULAR; INTRAVENOUS at 19:19

## 2020-08-01 RX ADMIN — HYDROCORTISONE SODIUM SUCCINATE SCH MG: 100 INJECTION, POWDER, FOR SOLUTION INTRAMUSCULAR; INTRAVENOUS at 10:51

## 2020-08-01 RX ADMIN — MORPHINE SULFATE PRN MG: 4 INJECTION, SOLUTION INTRAMUSCULAR; INTRAVENOUS at 23:36

## 2020-08-01 RX ADMIN — MINERAL OIL, PETROLATUM SCH INCH: 425; 568 OINTMENT OPHTHALMIC at 14:00

## 2020-08-01 RX ADMIN — MINERAL OIL, PETROLATUM SCH INCH: 425; 568 OINTMENT OPHTHALMIC at 07:21

## 2020-08-01 RX ADMIN — Medication SCH MMU: at 19:21

## 2020-08-01 RX ADMIN — PANTOPRAZOLE SODIUM SCH MLS/HR: 40 INJECTION, POWDER, FOR SOLUTION INTRAVENOUS at 15:15

## 2020-08-01 RX ADMIN — HEPARIN SODIUM SCH UNIT: 5000 INJECTION, SOLUTION INTRAVENOUS; SUBCUTANEOUS at 08:00

## 2020-08-01 RX ADMIN — PANTOPRAZOLE SODIUM SCH MLS/HR: 40 INJECTION, POWDER, FOR SOLUTION INTRAVENOUS at 10:51

## 2020-08-01 RX ADMIN — MINERAL OIL, PETROLATUM SCH INCH: 425; 568 OINTMENT OPHTHALMIC at 19:19

## 2020-08-01 RX ADMIN — MINERAL OIL, PETROLATUM SCH INCH: 425; 568 OINTMENT OPHTHALMIC at 07:22

## 2020-08-01 RX ADMIN — PANTOPRAZOLE SODIUM SCH MLS/HR: 40 INJECTION, POWDER, FOR SOLUTION INTRAVENOUS at 20:37

## 2020-08-01 RX ADMIN — HYDROCORTISONE SODIUM SUCCINATE SCH MG: 100 INJECTION, POWDER, FOR SOLUTION INTRAMUSCULAR; INTRAVENOUS at 15:15

## 2020-08-01 NOTE — NUR
Problems reprioritized. Patient report given, questions answered & plan of care reviewed 
with Ara MAGAÑA.

## 2020-08-01 NOTE — NUR
Cami Tanner called with critical Hct 22.  Cami called earlier as pt TDC site was found 
bleeding today and continues to bleed after dressing change and pressure applied.  Hemogram 
drawn, protonix gtt ordered, PT/PTT drawn.

## 2020-08-01 NOTE — NUR
Pts pressure dressing changed 3 times today for continued oozing blood from TDC site.  Pt 
wiggles around in bed frequently. Sitter at bedside.

## 2020-08-01 NOTE — NUR
Patient in room CICU 2013. I have received report from Bhavik MAGAÑA and had the opportunity to 
ask questions and assume patient care. Patient is restless in bed, he really wants to leave. 
TDC is covered but slowly oozing blood. Protonix drip running at 20mL/hr. Will continue to 
monitor closely.

## 2020-08-02 VITALS — DIASTOLIC BLOOD PRESSURE: 69 MMHG | SYSTOLIC BLOOD PRESSURE: 150 MMHG

## 2020-08-02 VITALS — DIASTOLIC BLOOD PRESSURE: 84 MMHG | SYSTOLIC BLOOD PRESSURE: 173 MMHG

## 2020-08-02 VITALS — SYSTOLIC BLOOD PRESSURE: 157 MMHG | DIASTOLIC BLOOD PRESSURE: 41 MMHG

## 2020-08-02 VITALS — DIASTOLIC BLOOD PRESSURE: 72 MMHG | SYSTOLIC BLOOD PRESSURE: 136 MMHG

## 2020-08-02 VITALS — DIASTOLIC BLOOD PRESSURE: 64 MMHG | SYSTOLIC BLOOD PRESSURE: 133 MMHG

## 2020-08-02 VITALS — DIASTOLIC BLOOD PRESSURE: 66 MMHG | SYSTOLIC BLOOD PRESSURE: 160 MMHG

## 2020-08-02 VITALS — SYSTOLIC BLOOD PRESSURE: 157 MMHG | DIASTOLIC BLOOD PRESSURE: 62 MMHG

## 2020-08-02 VITALS — SYSTOLIC BLOOD PRESSURE: 136 MMHG | DIASTOLIC BLOOD PRESSURE: 69 MMHG

## 2020-08-02 VITALS — DIASTOLIC BLOOD PRESSURE: 75 MMHG | SYSTOLIC BLOOD PRESSURE: 141 MMHG

## 2020-08-02 VITALS — SYSTOLIC BLOOD PRESSURE: 128 MMHG | DIASTOLIC BLOOD PRESSURE: 76 MMHG

## 2020-08-02 VITALS — SYSTOLIC BLOOD PRESSURE: 152 MMHG | DIASTOLIC BLOOD PRESSURE: 76 MMHG

## 2020-08-02 VITALS — DIASTOLIC BLOOD PRESSURE: 67 MMHG | SYSTOLIC BLOOD PRESSURE: 130 MMHG

## 2020-08-02 VITALS — DIASTOLIC BLOOD PRESSURE: 73 MMHG | SYSTOLIC BLOOD PRESSURE: 150 MMHG

## 2020-08-02 VITALS — SYSTOLIC BLOOD PRESSURE: 126 MMHG | DIASTOLIC BLOOD PRESSURE: 66 MMHG

## 2020-08-02 VITALS — SYSTOLIC BLOOD PRESSURE: 151 MMHG | DIASTOLIC BLOOD PRESSURE: 76 MMHG

## 2020-08-02 VITALS — DIASTOLIC BLOOD PRESSURE: 59 MMHG | SYSTOLIC BLOOD PRESSURE: 153 MMHG

## 2020-08-02 VITALS — DIASTOLIC BLOOD PRESSURE: 64 MMHG | SYSTOLIC BLOOD PRESSURE: 144 MMHG

## 2020-08-02 VITALS — DIASTOLIC BLOOD PRESSURE: 74 MMHG | SYSTOLIC BLOOD PRESSURE: 146 MMHG

## 2020-08-02 VITALS — DIASTOLIC BLOOD PRESSURE: 53 MMHG | SYSTOLIC BLOOD PRESSURE: 158 MMHG

## 2020-08-02 VITALS — DIASTOLIC BLOOD PRESSURE: 77 MMHG | SYSTOLIC BLOOD PRESSURE: 141 MMHG

## 2020-08-02 VITALS — DIASTOLIC BLOOD PRESSURE: 70 MMHG | SYSTOLIC BLOOD PRESSURE: 148 MMHG

## 2020-08-02 VITALS — SYSTOLIC BLOOD PRESSURE: 142 MMHG | DIASTOLIC BLOOD PRESSURE: 61 MMHG

## 2020-08-02 VITALS — DIASTOLIC BLOOD PRESSURE: 54 MMHG | SYSTOLIC BLOOD PRESSURE: 139 MMHG

## 2020-08-02 VITALS — SYSTOLIC BLOOD PRESSURE: 135 MMHG | DIASTOLIC BLOOD PRESSURE: 73 MMHG

## 2020-08-02 VITALS — SYSTOLIC BLOOD PRESSURE: 138 MMHG | DIASTOLIC BLOOD PRESSURE: 70 MMHG

## 2020-08-02 VITALS — SYSTOLIC BLOOD PRESSURE: 135 MMHG | DIASTOLIC BLOOD PRESSURE: 63 MMHG

## 2020-08-02 VITALS — SYSTOLIC BLOOD PRESSURE: 150 MMHG | DIASTOLIC BLOOD PRESSURE: 61 MMHG

## 2020-08-02 VITALS — DIASTOLIC BLOOD PRESSURE: 73 MMHG | SYSTOLIC BLOOD PRESSURE: 135 MMHG

## 2020-08-02 VITALS — SYSTOLIC BLOOD PRESSURE: 128 MMHG | DIASTOLIC BLOOD PRESSURE: 74 MMHG

## 2020-08-02 VITALS — DIASTOLIC BLOOD PRESSURE: 67 MMHG | SYSTOLIC BLOOD PRESSURE: 120 MMHG

## 2020-08-02 VITALS — DIASTOLIC BLOOD PRESSURE: 65 MMHG | SYSTOLIC BLOOD PRESSURE: 158 MMHG

## 2020-08-02 VITALS — SYSTOLIC BLOOD PRESSURE: 131 MMHG | DIASTOLIC BLOOD PRESSURE: 69 MMHG

## 2020-08-02 VITALS — DIASTOLIC BLOOD PRESSURE: 82 MMHG | SYSTOLIC BLOOD PRESSURE: 146 MMHG

## 2020-08-02 VITALS — DIASTOLIC BLOOD PRESSURE: 66 MMHG | SYSTOLIC BLOOD PRESSURE: 155 MMHG

## 2020-08-02 LAB
ALBUMIN SERPL BCP-MCNC: 1.7 G/DL (ref 3.4–5)
ALBUMIN/GLOB SERPL: 0.7 {RATIO} (ref 1.1–1.5)
ALP SERPL-CCNC: 113 IU/L (ref 46–116)
ALT SERPL W P-5'-P-CCNC: 142 U/L (ref 12–78)
ANION GAP SERPL CALCULATED.3IONS-SCNC: 18 MMOL/L (ref 8–16)
APTT PPP: 43 SECONDS (ref 22–32)
APTT PPP: 49 SECONDS (ref 22–32)
AST SERPL W P-5'-P-CCNC: 97 U/L (ref 10–37)
BACTERIA URNS QL MICRO: (no result) /HPF
BASOPHILS # BLD AUTO: 0 X10'3 (ref 0–0.2)
BASOPHILS NFR BLD AUTO: 0.2 % (ref 0–1)
BILIRUB SERPL-MCNC: 3.3 MG/DL (ref 0.1–1)
BUN SERPL-MCNC: 107 MG/DL (ref 7–18)
BUN/CREAT SERPL: 15.6 (ref 5.4–32)
CALCIUM SERPL-MCNC: 7.4 MG/DL (ref 8.5–10.1)
CHLORIDE SERPL-SCNC: 95 MMOL/L (ref 99–107)
CLARITY UR: (no result)
CO2 SERPL-SCNC: 20.1 MMOL/L (ref 24–32)
COLOR UR: YELLOW
CREAT SERPL-MCNC: 6.84 MG/DL (ref 0.6–1.1)
D DIMER PPP FEU-MCNC: 4.27 MG/L FEU (ref 0–0.5)
DEPRECATED SQUAMOUS URNS QL MICRO: (no result) /LPF
EOSINOPHIL # BLD AUTO: 0 X10'3 (ref 0–0.9)
EOSINOPHIL NFR BLD AUTO: 0 % (ref 0–6)
ERYTHROCYTE [DISTWIDTH] IN BLOOD BY AUTOMATED COUNT: 17.2 % (ref 11.5–14.5)
ERYTHROCYTE [DISTWIDTH] IN BLOOD BY AUTOMATED COUNT: 18.3 % (ref 11.5–14.5)
ERYTHROCYTE [DISTWIDTH] IN BLOOD BY AUTOMATED COUNT: 18.4 % (ref 11.5–14.5)
GFR SERPL CREATININE-BSD FRML MDRD: 9 ML/MIN
GLUCOSE SERPL-MCNC: 78 MG/DL (ref 70–104)
GLUCOSE UR STRIP-MCNC: NEGATIVE MG/DL
HCT VFR BLD AUTO: 20.4 % (ref 42–52)
HCT VFR BLD AUTO: 22.1 % (ref 42–52)
HCT VFR BLD AUTO: 24.1 % (ref 42–52)
HGB BLD-MCNC: 6.9 G/DL (ref 14–17.9)
HGB BLD-MCNC: 7.7 G/DL (ref 14–17.9)
HGB BLD-MCNC: 8.4 G/DL (ref 14–17.9)
HGB UR QL STRIP: (no result)
KETONES UR STRIP-MCNC: NEGATIVE MG/DL
LEUKOCYTE ESTERASE UR QL STRIP: NEGATIVE
LYMPHOCYTES # BLD AUTO: 0.4 X10'3 (ref 1.1–4.8)
LYMPHOCYTES NFR BLD AUTO: 3.2 % (ref 21–51)
MAGNESIUM SERPL-MCNC: 2 MG/DL (ref 1.5–2.4)
MCH RBC QN AUTO: 32.3 PG (ref 27–31)
MCH RBC QN AUTO: 32.5 PG (ref 27–31)
MCH RBC QN AUTO: 32.9 PG (ref 27–31)
MCHC RBC AUTO-ENTMCNC: 33.9 G/DL (ref 33–36.5)
MCHC RBC AUTO-ENTMCNC: 34.7 G/DL (ref 33–36.5)
MCHC RBC AUTO-ENTMCNC: 34.8 G/DL (ref 33–36.5)
MCV RBC AUTO: 93.4 FL (ref 78–98)
MCV RBC AUTO: 94.8 FL (ref 78–98)
MCV RBC AUTO: 95.4 FL (ref 78–98)
MONOCYTES # BLD AUTO: 0.9 X10'3 (ref 0–0.9)
MONOCYTES NFR BLD AUTO: 6.2 % (ref 2–12)
MUCOUS THREADS URNS QL MICRO: (no result) /LPF
NEUTROPHILS # BLD AUTO: 12.5 X10'3 (ref 1.8–7.7)
NEUTROPHILS NFR BLD AUTO: 90.4 % (ref 42–75)
NITRITE UR QL STRIP: NEGATIVE
PH UR STRIP: 7 [PH] (ref 4.8–8)
PHOSPHATE SERPL-MCNC: 9.2 MG/DL (ref 2.3–4.5)
PLATELET # BLD AUTO: 266 X10'3 (ref 140–440)
PLATELET # BLD AUTO: 333 X10'3 (ref 140–440)
PLATELET # BLD AUTO: 384 X10'3 (ref 140–440)
PMV BLD AUTO: 7.6 FL (ref 7.4–10.4)
PMV BLD AUTO: 7.8 FL (ref 7.4–10.4)
PMV BLD AUTO: 8.3 FL (ref 7.4–10.4)
POTASSIUM SERPL-SCNC: 4.9 MMOL/L (ref 3.5–5.1)
PROT SERPL-MCNC: 4 G/DL (ref 6.4–8.2)
PROT UR QL STRIP: (no result) MG/DL
RBC # BLD AUTO: 2.14 X10'6 (ref 4.7–6.1)
RBC # BLD AUTO: 2.33 X10'6 (ref 4.7–6.1)
RBC # BLD AUTO: 2.58 X10'6 (ref 4.7–6.1)
SODIUM SERPL-SCNC: 133 MMOL/L (ref 135–145)
SP GR UR STRIP: 1.01 (ref 1–1.03)
URN COLLECT METHOD CLASS: (no result)
UROBILINOGEN UR STRIP-MCNC: 0.2 E.U/DL (ref 0.2–1)
WBC # BLD AUTO: 10.5 X10'3 (ref 4.5–11)
WBC # BLD AUTO: 13.8 X10'3 (ref 4.5–11)
WBC # BLD AUTO: 6.8 X10'3 (ref 4.5–11)
WBC #/AREA URNS HPF: (no result) /HPF (ref 0–4)
WBC CLUMPS #/AREA URNS HPF: (no result) /HPF

## 2020-08-02 RX ADMIN — MINERAL OIL, PETROLATUM SCH INCH: 425; 568 OINTMENT OPHTHALMIC at 08:00

## 2020-08-02 RX ADMIN — PANTOPRAZOLE SODIUM SCH MLS/HR: 40 INJECTION, POWDER, FOR SOLUTION INTRAVENOUS at 14:21

## 2020-08-02 RX ADMIN — PANTOPRAZOLE SODIUM SCH MLS/HR: 40 INJECTION, POWDER, FOR SOLUTION INTRAVENOUS at 02:14

## 2020-08-02 RX ADMIN — MORPHINE SULFATE PRN MG: 4 INJECTION, SOLUTION INTRAMUSCULAR; INTRAVENOUS at 20:02

## 2020-08-02 RX ADMIN — PANTOPRAZOLE SODIUM SCH MLS/HR: 40 INJECTION, POWDER, FOR SOLUTION INTRAVENOUS at 07:02

## 2020-08-02 RX ADMIN — Medication SCH MMU: at 08:55

## 2020-08-02 RX ADMIN — MORPHINE SULFATE PRN MG: 4 INJECTION, SOLUTION INTRAMUSCULAR; INTRAVENOUS at 08:40

## 2020-08-02 RX ADMIN — HYDROCORTISONE SODIUM SUCCINATE SCH MG: 100 INJECTION, POWDER, FOR SOLUTION INTRAMUSCULAR; INTRAVENOUS at 08:38

## 2020-08-02 RX ADMIN — Medication SCH MMU: at 20:01

## 2020-08-02 RX ADMIN — MORPHINE SULFATE PRN MG: 4 INJECTION, SOLUTION INTRAMUSCULAR; INTRAVENOUS at 14:21

## 2020-08-02 RX ADMIN — MINERAL OIL, PETROLATUM SCH INCH: 425; 568 OINTMENT OPHTHALMIC at 02:15

## 2020-08-02 RX ADMIN — PANTOPRAZOLE SODIUM SCH MLS/HR: 40 INJECTION, POWDER, FOR SOLUTION INTRAVENOUS at 15:16

## 2020-08-02 RX ADMIN — HYDROCORTISONE SODIUM SUCCINATE SCH MG: 100 INJECTION, POWDER, FOR SOLUTION INTRAMUSCULAR; INTRAVENOUS at 02:16

## 2020-08-02 RX ADMIN — PANTOPRAZOLE SODIUM SCH MLS/HR: 40 INJECTION, POWDER, FOR SOLUTION INTRAVENOUS at 23:11

## 2020-08-02 RX ADMIN — ACETAMINOPHEN PRN MG: 160 SUSPENSION ORAL at 15:17

## 2020-08-02 NOTE — NUR
called Stephenie NP regarding pts elevated temp, new orders received for pan cultures,  no 
abo ordered at this time as RICKI Chavez will review the pts condition

## 2020-08-02 NOTE — NUR
Notified Dr. Deras of continued excessive bleeding and trending H/H. Notified Dr. Keen 
of continued oozing from TDC. Requested advise on if TDC should be left in place and what 
plan is for bleeding. Jenny Tanner notified and ordered chest xray and she came to view 
patient's bleeding. Dressing changed at 1430 and 1800 and continued bleeding showed to RN 
jamey.

## 2020-08-02 NOTE — NUR
Patient in room CICU 2013. I have received report from  Ysabel MAGAÑA  and had the opportunity 
to ask questions and assume patient care.

## 2020-08-02 NOTE — NUR
CALLED DR. PEREZ



Pt. has critical H&H (Hgb 6.9 Hct 20.4), orders for PRBC's. Will do updated type and screen.

## 2020-08-02 NOTE — NUR
Problems reprioritized. Patient report given, questions answered & plan of care reviewed 
with Maria Isabel RN.

## 2020-08-02 NOTE — NUR
Patient in room CICU 2013. I have received report from Ara MAGAÑA and had the opportunity to 
ask questions and assume patient care.

## 2020-08-03 VITALS — SYSTOLIC BLOOD PRESSURE: 76 MMHG | DIASTOLIC BLOOD PRESSURE: 24 MMHG

## 2020-08-03 VITALS — DIASTOLIC BLOOD PRESSURE: 30 MMHG | SYSTOLIC BLOOD PRESSURE: 90 MMHG

## 2020-08-03 VITALS — DIASTOLIC BLOOD PRESSURE: 33 MMHG | SYSTOLIC BLOOD PRESSURE: 102 MMHG

## 2020-08-03 VITALS — DIASTOLIC BLOOD PRESSURE: 32 MMHG | SYSTOLIC BLOOD PRESSURE: 102 MMHG

## 2020-08-03 VITALS — DIASTOLIC BLOOD PRESSURE: 53 MMHG | SYSTOLIC BLOOD PRESSURE: 130 MMHG

## 2020-08-03 VITALS — SYSTOLIC BLOOD PRESSURE: 130 MMHG | DIASTOLIC BLOOD PRESSURE: 58 MMHG

## 2020-08-03 VITALS — DIASTOLIC BLOOD PRESSURE: 66 MMHG | SYSTOLIC BLOOD PRESSURE: 126 MMHG

## 2020-08-03 VITALS — SYSTOLIC BLOOD PRESSURE: 124 MMHG | DIASTOLIC BLOOD PRESSURE: 50 MMHG

## 2020-08-03 VITALS — DIASTOLIC BLOOD PRESSURE: 40 MMHG | SYSTOLIC BLOOD PRESSURE: 123 MMHG

## 2020-08-03 VITALS — SYSTOLIC BLOOD PRESSURE: 134 MMHG | DIASTOLIC BLOOD PRESSURE: 48 MMHG

## 2020-08-03 VITALS — SYSTOLIC BLOOD PRESSURE: 145 MMHG | DIASTOLIC BLOOD PRESSURE: 59 MMHG

## 2020-08-03 VITALS — SYSTOLIC BLOOD PRESSURE: 103 MMHG | DIASTOLIC BLOOD PRESSURE: 42 MMHG

## 2020-08-03 VITALS — DIASTOLIC BLOOD PRESSURE: 49 MMHG | SYSTOLIC BLOOD PRESSURE: 117 MMHG

## 2020-08-03 VITALS — SYSTOLIC BLOOD PRESSURE: 148 MMHG | DIASTOLIC BLOOD PRESSURE: 75 MMHG

## 2020-08-03 VITALS — SYSTOLIC BLOOD PRESSURE: 110 MMHG | DIASTOLIC BLOOD PRESSURE: 49 MMHG

## 2020-08-03 VITALS — SYSTOLIC BLOOD PRESSURE: 137 MMHG | DIASTOLIC BLOOD PRESSURE: 56 MMHG

## 2020-08-03 VITALS — DIASTOLIC BLOOD PRESSURE: 40 MMHG | SYSTOLIC BLOOD PRESSURE: 91 MMHG

## 2020-08-03 VITALS — DIASTOLIC BLOOD PRESSURE: 57 MMHG | SYSTOLIC BLOOD PRESSURE: 124 MMHG

## 2020-08-03 VITALS — DIASTOLIC BLOOD PRESSURE: 26 MMHG | SYSTOLIC BLOOD PRESSURE: 94 MMHG

## 2020-08-03 VITALS — DIASTOLIC BLOOD PRESSURE: 56 MMHG | SYSTOLIC BLOOD PRESSURE: 143 MMHG

## 2020-08-03 VITALS — SYSTOLIC BLOOD PRESSURE: 118 MMHG | DIASTOLIC BLOOD PRESSURE: 48 MMHG

## 2020-08-03 VITALS — DIASTOLIC BLOOD PRESSURE: 29 MMHG | SYSTOLIC BLOOD PRESSURE: 104 MMHG

## 2020-08-03 VITALS — SYSTOLIC BLOOD PRESSURE: 101 MMHG | DIASTOLIC BLOOD PRESSURE: 34 MMHG

## 2020-08-03 VITALS — DIASTOLIC BLOOD PRESSURE: 62 MMHG | SYSTOLIC BLOOD PRESSURE: 178 MMHG

## 2020-08-03 VITALS — SYSTOLIC BLOOD PRESSURE: 128 MMHG | DIASTOLIC BLOOD PRESSURE: 83 MMHG

## 2020-08-03 VITALS — DIASTOLIC BLOOD PRESSURE: 38 MMHG | SYSTOLIC BLOOD PRESSURE: 144 MMHG

## 2020-08-03 VITALS — DIASTOLIC BLOOD PRESSURE: 47 MMHG | SYSTOLIC BLOOD PRESSURE: 107 MMHG

## 2020-08-03 VITALS — DIASTOLIC BLOOD PRESSURE: 51 MMHG | SYSTOLIC BLOOD PRESSURE: 155 MMHG

## 2020-08-03 VITALS — DIASTOLIC BLOOD PRESSURE: 69 MMHG | SYSTOLIC BLOOD PRESSURE: 150 MMHG

## 2020-08-03 VITALS — SYSTOLIC BLOOD PRESSURE: 103 MMHG | DIASTOLIC BLOOD PRESSURE: 44 MMHG

## 2020-08-03 VITALS — SYSTOLIC BLOOD PRESSURE: 111 MMHG | DIASTOLIC BLOOD PRESSURE: 49 MMHG

## 2020-08-03 VITALS — DIASTOLIC BLOOD PRESSURE: 52 MMHG | SYSTOLIC BLOOD PRESSURE: 134 MMHG

## 2020-08-03 VITALS — DIASTOLIC BLOOD PRESSURE: 44 MMHG | SYSTOLIC BLOOD PRESSURE: 136 MMHG

## 2020-08-03 VITALS — SYSTOLIC BLOOD PRESSURE: 124 MMHG | DIASTOLIC BLOOD PRESSURE: 55 MMHG

## 2020-08-03 VITALS — DIASTOLIC BLOOD PRESSURE: 36 MMHG | SYSTOLIC BLOOD PRESSURE: 93 MMHG

## 2020-08-03 VITALS — SYSTOLIC BLOOD PRESSURE: 108 MMHG | DIASTOLIC BLOOD PRESSURE: 69 MMHG

## 2020-08-03 LAB
ALBUMIN SERPL BCP-MCNC: 0.9 G/DL (ref 3.4–5)
ALBUMIN SERPL BCP-MCNC: 1 G/DL (ref 3.4–5)
ALBUMIN SERPL BCP-MCNC: 1.3 G/DL (ref 3.4–5)
ALBUMIN SERPL BCP-MCNC: 1.5 G/DL (ref 3.4–5)
ALBUMIN/GLOB SERPL: 0.5 {RATIO} (ref 1.1–1.5)
ALBUMIN/GLOB SERPL: 0.5 {RATIO} (ref 1.1–1.5)
ALBUMIN/GLOB SERPL: 0.6 {RATIO} (ref 1.1–1.5)
ALBUMIN/GLOB SERPL: 0.6 {RATIO} (ref 1.1–1.5)
ALP SERPL-CCNC: 103 IU/L (ref 46–116)
ALP SERPL-CCNC: 110 IU/L (ref 46–116)
ALP SERPL-CCNC: 125 IU/L (ref 46–116)
ALP SERPL-CCNC: 96 IU/L (ref 46–116)
ALT SERPL W P-5'-P-CCNC: 134 U/L (ref 12–78)
ALT SERPL W P-5'-P-CCNC: 70 U/L (ref 12–78)
ALT SERPL W P-5'-P-CCNC: 71 U/L (ref 12–78)
ALT SERPL W P-5'-P-CCNC: 84 U/L (ref 12–78)
ANION GAP SERPL CALCULATED.3IONS-SCNC: 14 MMOL/L (ref 8–16)
ANION GAP SERPL CALCULATED.3IONS-SCNC: 22 MMOL/L (ref 8–16)
ANION GAP SERPL CALCULATED.3IONS-SCNC: 23 MMOL/L (ref 8–16)
ANION GAP SERPL CALCULATED.3IONS-SCNC: 24 MMOL/L (ref 8–16)
ANISOCYTOSIS BLD QL SMEAR: (no result)
APTT PPP: 61 SECONDS (ref 22–32)
APTT PPP: 73 SECONDS (ref 22–32)
APTT PPP: 86 SECONDS (ref 22–32)
AST SERPL W P-5'-P-CCNC: 107 U/L (ref 10–37)
AST SERPL W P-5'-P-CCNC: 75 U/L (ref 10–37)
AST SERPL W P-5'-P-CCNC: 86 U/L (ref 10–37)
AST SERPL W P-5'-P-CCNC: 91 U/L (ref 10–37)
BASE EXCESS BLDA CALC-SCNC: -15.9 MMOL/L (ref -2–2)
BASE EXCESS BLDA CALC-SCNC: -20.3 MMOL/L (ref -2–2)
BASE EXCESS BLDA CALC-SCNC: -9.7 MMOL/L (ref -2–2)
BASOPHILS # BLD AUTO: 0 X10'3 (ref 0–0.2)
BASOPHILS NFR BLD AUTO: 0 % (ref 0–1)
BASOPHILS NFR BLD AUTO: 0.2 % (ref 0–1)
BASOPHILS NFR BLD AUTO: 0.2 % (ref 0–1)
BASOPHILS NFR BLD AUTO: 0.4 % (ref 0–1)
BILIRUB SERPL-MCNC: 2 MG/DL (ref 0.1–1)
BILIRUB SERPL-MCNC: 2.1 MG/DL (ref 0.1–1)
BILIRUB SERPL-MCNC: 2.8 MG/DL (ref 0.1–1)
BILIRUB SERPL-MCNC: 3.9 MG/DL (ref 0.1–1)
BODY TEMPERATURE: 37
BODY TEMPERATURE: 38
BODY TEMPERATURE: 38.2
BUN SERPL-MCNC: 134 MG/DL (ref 7–18)
BUN SERPL-MCNC: 48 MG/DL (ref 7–18)
BUN SERPL-MCNC: 58 MG/DL (ref 7–18)
BUN SERPL-MCNC: 61 MG/DL (ref 7–18)
BUN/CREAT SERPL: 11.9 (ref 5.4–32)
BUN/CREAT SERPL: 11.9 (ref 5.4–32)
BUN/CREAT SERPL: 12.6 (ref 5.4–32)
BUN/CREAT SERPL: 16 (ref 5.4–32)
CALCIUM SERPL-MCNC: 6.8 MG/DL (ref 8.5–10.1)
CALCIUM SERPL-MCNC: 6.9 MG/DL (ref 8.5–10.1)
CALCIUM SERPL-MCNC: 7 MG/DL (ref 8.5–10.1)
CALCIUM SERPL-MCNC: 7.2 MG/DL (ref 8.5–10.1)
CHLORIDE SERPL-SCNC: 101 MMOL/L (ref 99–107)
CHLORIDE SERPL-SCNC: 101 MMOL/L (ref 99–107)
CHLORIDE SERPL-SCNC: 102 MMOL/L (ref 99–107)
CHLORIDE SERPL-SCNC: 91 MMOL/L (ref 99–107)
CK SERPL-CCNC: 341 U/L (ref 39–308)
CK SERPL-CCNC: 348 U/L (ref 39–308)
CK SERPL-CCNC: 417 U/L (ref 39–308)
CO2 SERPL-SCNC: 10.8 MMOL/L (ref 24–32)
CO2 SERPL-SCNC: 14.9 MMOL/L (ref 24–32)
CO2 SERPL-SCNC: 19.3 MMOL/L (ref 24–32)
CO2 SERPL-SCNC: 9.4 MMOL/L (ref 24–32)
COHGB MFR BLDA: 0.1 % (ref 0–3.9)
COHGB MFR BLDA: 0.3 % (ref 0–3.9)
COHGB MFR BLDA: 0.3 % (ref 0–3.9)
CREAT SERPL-MCNC: 3.8 MG/DL (ref 0.6–1.1)
CREAT SERPL-MCNC: 4.89 MG/DL (ref 0.6–1.1)
CREAT SERPL-MCNC: 5.14 MG/DL (ref 0.6–1.1)
CREAT SERPL-MCNC: 8.38 MG/DL (ref 0.6–1.1)
D DIMER PPP FEU-MCNC: 10.59 MG/L FEU (ref 0–0.5)
EOSINOPHIL # BLD AUTO: 0 X10'3 (ref 0–0.9)
EOSINOPHIL # BLD AUTO: 0.1 X10'3 (ref 0–0.9)
EOSINOPHIL NFR BLD AUTO: 0.3 % (ref 0–6)
EOSINOPHIL NFR BLD AUTO: 1.5 % (ref 0–6)
EOSINOPHIL NFR BLD AUTO: 1.5 % (ref 0–6)
EOSINOPHIL NFR BLD AUTO: 6.7 % (ref 0–6)
ERYTHROCYTE [DISTWIDTH] IN BLOOD BY AUTOMATED COUNT: 15.8 % (ref 11.5–14.5)
ERYTHROCYTE [DISTWIDTH] IN BLOOD BY AUTOMATED COUNT: 16.4 % (ref 11.5–14.5)
ERYTHROCYTE [DISTWIDTH] IN BLOOD BY AUTOMATED COUNT: 16.4 % (ref 11.5–14.5)
ERYTHROCYTE [DISTWIDTH] IN BLOOD BY AUTOMATED COUNT: 17.9 % (ref 11.5–14.5)
GFR SERPL CREATININE-BSD FRML MDRD: 13 ML/MIN
GFR SERPL CREATININE-BSD FRML MDRD: 13 ML/MIN
GFR SERPL CREATININE-BSD FRML MDRD: 18 ML/MIN
GFR SERPL CREATININE-BSD FRML MDRD: 7 ML/MIN
GLUCOSE SERPL-MCNC: 34 MG/DL (ref 70–104)
GLUCOSE SERPL-MCNC: 41 MG/DL (ref 70–104)
GLUCOSE SERPL-MCNC: 53 MG/DL (ref 70–104)
GLUCOSE SERPL-MCNC: 67 MG/DL (ref 70–104)
HCO3 BLDA-SCNC: 14.2 MMOL/L (ref 22–26)
HCO3 BLDA-SCNC: 7 MMOL/L (ref 22–26)
HCO3 BLDA-SCNC: 7.2 MMOL/L (ref 22–26)
HCT VFR BLD AUTO: 20.4 % (ref 42–52)
HCT VFR BLD AUTO: 23.8 % (ref 42–52)
HCT VFR BLD AUTO: 24.1 % (ref 42–52)
HCT VFR BLD AUTO: 24.4 % (ref 42–52)
HGB BLD-MCNC: 7.1 G/DL (ref 14–17.9)
HGB BLD-MCNC: 8.1 G/DL (ref 14–17.9)
HGB BLD-MCNC: 8.1 G/DL (ref 14–17.9)
HGB BLD-MCNC: 8.2 G/DL (ref 14–17.9)
HGB BLDA-MCNC: 11.5 G/DL (ref 14–18)
HGB BLDA-MCNC: 7.4 G/DL (ref 14–18)
HGB BLDA-MCNC: 8.2 G/DL (ref 14–18)
LYMPHOCYTES # BLD AUTO: 0.1 X10'3 (ref 1.1–4.8)
LYMPHOCYTES # BLD AUTO: 0.1 X10'3 (ref 1.1–4.8)
LYMPHOCYTES # BLD AUTO: 0.2 X10'3 (ref 1.1–4.8)
LYMPHOCYTES # BLD AUTO: 0.2 X10'3 (ref 1.1–4.8)
LYMPHOCYTES NFR BLD AUTO: 14.2 % (ref 21–51)
LYMPHOCYTES NFR BLD AUTO: 4.4 % (ref 21–51)
LYMPHOCYTES NFR BLD AUTO: 5.9 % (ref 21–51)
LYMPHOCYTES NFR BLD AUTO: 8.8 % (ref 21–51)
LYMPHOCYTES NFR BLD MANUAL: 3 % (ref 21–51)
MAGNESIUM SERPL-MCNC: 1.6 MG/DL (ref 1.5–2.4)
MCH RBC QN AUTO: 31.9 PG (ref 27–31)
MCH RBC QN AUTO: 32.3 PG (ref 27–31)
MCH RBC QN AUTO: 32.3 PG (ref 27–31)
MCH RBC QN AUTO: 33 PG (ref 27–31)
MCHC RBC AUTO-ENTMCNC: 33.5 G/DL (ref 33–36.5)
MCHC RBC AUTO-ENTMCNC: 33.5 G/DL (ref 33–36.5)
MCHC RBC AUTO-ENTMCNC: 34.1 G/DL (ref 33–36.5)
MCHC RBC AUTO-ENTMCNC: 35 G/DL (ref 33–36.5)
MCV RBC AUTO: 94.3 FL (ref 78–98)
MCV RBC AUTO: 94.6 FL (ref 78–98)
MCV RBC AUTO: 95.1 FL (ref 78–98)
MCV RBC AUTO: 96.5 FL (ref 78–98)
METAMYELOCYTES NFR BLD MANUAL: 2 % (ref 0–0)
METHGB MFR BLDA: 0.1 % (ref 0–1.5)
METHGB MFR BLDA: 0.3 % (ref 0–1.5)
METHGB MFR BLDA: 0.4 % (ref 0–1.5)
MONOCYTES # BLD AUTO: 0 X10'3 (ref 0–0.9)
MONOCYTES NFR BLD AUTO: 0.8 % (ref 2–12)
MONOCYTES NFR BLD AUTO: 1 % (ref 2–12)
MONOCYTES NFR BLD AUTO: 1 % (ref 2–12)
MONOCYTES NFR BLD AUTO: 2 % (ref 2–12)
MONOCYTES NFR BLD MANUAL: 1 % (ref 2–12)
NEUTROPHILS # BLD AUTO: 0.7 X10'3 (ref 1.8–7.7)
NEUTROPHILS # BLD AUTO: 1.3 X10'3 (ref 1.8–7.7)
NEUTROPHILS # BLD AUTO: 1.7 X10'3 (ref 1.8–7.7)
NEUTROPHILS # BLD AUTO: 2.7 X10'3 (ref 1.8–7.7)
NEUTROPHILS NFR BLD AUTO: 82.1 % (ref 42–75)
NEUTROPHILS NFR BLD AUTO: 87.9 % (ref 42–75)
NEUTROPHILS NFR BLD AUTO: 88.3 % (ref 42–75)
NEUTROPHILS NFR BLD AUTO: 92.8 % (ref 42–75)
NEUTS BAND # BLD MANUAL: 66 % (ref 0–10)
NEUTS SEG NFR BLD MANUAL: 28 % (ref 42–75)
NEUTS VAC BLD QL SMEAR: (no result)
O2/TOTAL GAS SETTING VFR VENT: 21 MMHG/%
O2/TOTAL GAS SETTING VFR VENT: 70 MMHG/%
O2/TOTAL GAS SETTING VFR VENT: 80 MMHG/%
OXYHGB MFR BLDA: 84.4 % (ref 94–97)
OXYHGB MFR BLDA: 90.6 % (ref 94–97)
OXYHGB MFR BLDA: 93.2 % (ref 94–97)
PCO2 TEMP ADJ BLDA: 12.7 MMHG (ref 35–48)
PCO2 TEMP ADJ BLDA: 22.2 MMHG (ref 35–48)
PCO2 TEMP ADJ BLDA: 25.4 MMHG (ref 35–48)
PEEP RESPIRATORY: 5 CM H2O
PEEP RESPIRATORY: 5 CM H2O
PHOSPHATE SERPL-MCNC: 9.7 MG/DL (ref 2.3–4.5)
PLATELET # BLD AUTO: 130 X10'3 (ref 140–440)
PLATELET # BLD AUTO: 18 X10'3 (ref 140–440)
PLATELET # BLD AUTO: 251 X10'3 (ref 140–440)
PLATELET # BLD AUTO: 262 X10'3 (ref 140–440)
PLATELET # BLD AUTO: 78 X10'3 (ref 140–440)
PLATELET BLD QL SMEAR: NORMAL
PMV BLD AUTO: 7.7 FL (ref 7.4–10.4)
PMV BLD AUTO: 7.9 FL (ref 7.4–10.4)
PMV BLD AUTO: 8.1 FL (ref 7.4–10.4)
PMV BLD AUTO: 8.3 FL (ref 7.4–10.4)
PO2 TEMP ADJ BLD: 60.3 MMHG (ref 75–100)
PO2 TEMP ADJ BLD: 60.6 MMHG (ref 75–100)
PO2 TEMP ADJ BLD: 97.2 MMHG (ref 75–100)
PO2 TEMP ADJ BLDMV: 35.7 MMHG (ref 35–46)
POTASSIUM SERPL-SCNC: 3.9 MMOL/L (ref 3.5–5.1)
POTASSIUM SERPL-SCNC: 4.7 MMOL/L (ref 3.5–5.1)
POTASSIUM SERPL-SCNC: 4.8 MMOL/L (ref 3.5–5.1)
POTASSIUM SERPL-SCNC: 6.2 MMOL/L (ref 3.5–5.1)
PREALB SERPL-MCNC: 16.9 MG/DL (ref 19–36)
PROT SERPL-MCNC: 2.8 G/DL (ref 6.4–8.2)
PROT SERPL-MCNC: 3 G/DL (ref 6.4–8.2)
PROT SERPL-MCNC: 3.4 G/DL (ref 6.4–8.2)
PROT SERPL-MCNC: 3.9 G/DL (ref 6.4–8.2)
RBC # BLD AUTO: 2.16 X10'6 (ref 4.7–6.1)
RBC # BLD AUTO: 2.52 X10'6 (ref 4.7–6.1)
RBC # BLD AUTO: 2.53 X10'6 (ref 4.7–6.1)
RBC # BLD AUTO: 2.53 X10'6 (ref 4.7–6.1)
RBC MORPH BLD: (no result)
RESP RATE 1H: 20 B/MIN
RESP RATE 1H: 20 B/MIN
SAO2 % BLDA: 84.9 % (ref 94–97)
SAO2 % BLDA: 91 % (ref 94–97)
SAO2 % BLDA: 93.7 % (ref 94–97)
SAO2 % BLDMV: 66.4 % (ref 60–80)
SODIUM SERPL-SCNC: 128 MMOL/L (ref 135–145)
SODIUM SERPL-SCNC: 134 MMOL/L (ref 135–145)
SODIUM SERPL-SCNC: 135 MMOL/L (ref 135–145)
SODIUM SERPL-SCNC: 135 MMOL/L (ref 135–145)
SPONT VT COMPRES GAS VOL SET UNCOR VENT: 550 ML
SPONT VT COMPRES GAS VOL SET UNCOR VENT: 550 ML
TOTAL CELLS COUNTED FLD: 100
TOXIC GRANULES BLD QL SMEAR: (no result)
WBC # BLD AUTO: 0.9 X10'3 (ref 4.5–11)
WBC # BLD AUTO: 1.6 X10'3 (ref 4.5–11)
WBC # BLD AUTO: 1.9 X10'3 (ref 4.5–11)
WBC # BLD AUTO: 2.9 X10'3 (ref 4.5–11)

## 2020-08-03 PROCEDURE — 5A1945Z RESPIRATORY VENTILATION, 24-96 CONSECUTIVE HOURS: ICD-10-PCS | Performed by: INTERNAL MEDICINE

## 2020-08-03 PROCEDURE — 5A1D70Z PERFORMANCE OF URINARY FILTRATION, INTERMITTENT, LESS THAN 6 HOURS PER DAY: ICD-10-PCS | Performed by: INTERNAL MEDICINE

## 2020-08-03 PROCEDURE — 30233N1 TRANSFUSION OF NONAUTOLOGOUS RED BLOOD CELLS INTO PERIPHERAL VEIN, PERCUTANEOUS APPROACH: ICD-10-PCS | Performed by: INTERNAL MEDICINE

## 2020-08-03 PROCEDURE — 30233L1 TRANSFUSION OF NONAUTOLOGOUS FRESH PLASMA INTO PERIPHERAL VEIN, PERCUTANEOUS APPROACH: ICD-10-PCS | Performed by: INTERNAL MEDICINE

## 2020-08-03 PROCEDURE — 0DJ08ZZ INSPECTION OF UPPER INTESTINAL TRACT, VIA NATURAL OR ARTIFICIAL OPENING ENDOSCOPIC: ICD-10-PCS | Performed by: INTERNAL MEDICINE

## 2020-08-03 RX ADMIN — PANTOPRAZOLE SODIUM SCH MLS/HR: 40 INJECTION, POWDER, FOR SOLUTION INTRAVENOUS at 04:56

## 2020-08-03 RX ADMIN — Medication SCH MLS/HR: at 16:09

## 2020-08-03 RX ADMIN — Medication SCH MLS/HR: at 10:00

## 2020-08-03 RX ADMIN — DEXTROSE MONOHYDRATE PRN ML: 25 INJECTION, SOLUTION INTRAVENOUS at 08:57

## 2020-08-03 RX ADMIN — DEXTROSE MONOHYDRATE PRN ML: 25 INJECTION, SOLUTION INTRAVENOUS at 22:13

## 2020-08-03 RX ADMIN — DEXMEDETOMIDINE HYDROCHLORIDE SCH MLS/HR: 100 INJECTION, SOLUTION INTRAVENOUS at 22:53

## 2020-08-03 RX ADMIN — Medication SCH MMU: at 08:00

## 2020-08-03 RX ADMIN — Medication PRN MLS/HR: at 22:19

## 2020-08-03 RX ADMIN — PANTOPRAZOLE SODIUM SCH MLS/HR: 40 INJECTION, POWDER, FOR SOLUTION INTRAVENOUS at 22:25

## 2020-08-03 RX ADMIN — DEXTROSE MONOHYDRATE PRN ML: 25 INJECTION, SOLUTION INTRAVENOUS at 15:36

## 2020-08-03 RX ADMIN — MIDAZOLAM HYDROCHLORIDE SCH MLS/HR: 5 INJECTION, SOLUTION INTRAMUSCULAR; INTRAVENOUS at 12:43

## 2020-08-03 RX ADMIN — Medication SCH MLS/HR: at 17:02

## 2020-08-03 RX ADMIN — ALBUTEROL SULFATE SCH MG: 2.5 SOLUTION RESPIRATORY (INHALATION) at 16:00

## 2020-08-03 RX ADMIN — DEXMEDETOMIDINE HYDROCHLORIDE SCH MLS/HR: 100 INJECTION, SOLUTION INTRAVENOUS at 11:35

## 2020-08-03 RX ADMIN — PANTOPRAZOLE SODIUM SCH MLS/HR: 40 INJECTION, POWDER, FOR SOLUTION INTRAVENOUS at 21:00

## 2020-08-03 RX ADMIN — Medication PRN MLS/HR: at 16:13

## 2020-08-03 RX ADMIN — DEXTROSE MONOHYDRATE PRN ML: 25 INJECTION, SOLUTION INTRAVENOUS at 06:53

## 2020-08-03 RX ADMIN — ALBUTEROL SULFATE SCH MG: 2.5 SOLUTION RESPIRATORY (INHALATION) at 18:59

## 2020-08-03 RX ADMIN — HYDROCORTISONE SODIUM SUCCINATE SCH MG: 100 INJECTION, POWDER, FOR SOLUTION INTRAMUSCULAR; INTRAVENOUS at 08:22

## 2020-08-03 RX ADMIN — MORPHINE SULFATE PRN MG: 4 INJECTION, SOLUTION INTRAMUSCULAR; INTRAVENOUS at 00:29

## 2020-08-03 RX ADMIN — MIDAZOLAM HYDROCHLORIDE SCH MLS/HR: 5 INJECTION, SOLUTION INTRAMUSCULAR; INTRAVENOUS at 18:00

## 2020-08-03 RX ADMIN — PANTOPRAZOLE SODIUM SCH MLS/HR: 40 INJECTION, POWDER, FOR SOLUTION INTRAVENOUS at 16:36

## 2020-08-03 RX ADMIN — DEXTROSE MONOHYDRATE PRN ML: 25 INJECTION, SOLUTION INTRAVENOUS at 12:05

## 2020-08-03 RX ADMIN — MORPHINE SULFATE PRN MG: 4 INJECTION, SOLUTION INTRAMUSCULAR; INTRAVENOUS at 07:28

## 2020-08-03 RX ADMIN — Medication SCH MLS/HR: at 18:43

## 2020-08-03 RX ADMIN — DEXTROSE MONOHYDRATE SCH MLS/HR: 25 INJECTION, SOLUTION INTRAVENOUS at 19:38

## 2020-08-03 RX ADMIN — Medication PRN MLS/HR: at 21:52

## 2020-08-03 RX ADMIN — DEXTROSE MONOHYDRATE PRN ML: 25 INJECTION, SOLUTION INTRAVENOUS at 13:15

## 2020-08-03 RX ADMIN — ALBUTEROL SULFATE SCH MG: 2.5 SOLUTION RESPIRATORY (INHALATION) at 23:05

## 2020-08-03 RX ADMIN — DEXTROSE MONOHYDRATE PRN ML: 25 INJECTION, SOLUTION INTRAVENOUS at 07:47

## 2020-08-03 RX ADMIN — DEXTROSE MONOHYDRATE PRN ML: 25 INJECTION, SOLUTION INTRAVENOUS at 17:58

## 2020-08-03 RX ADMIN — Medication PRN MLS/HR: at 16:15

## 2020-08-03 RX ADMIN — DEXTROSE MONOHYDRATE SCH MLS/HR: 25 INJECTION, SOLUTION INTRAVENOUS at 19:08

## 2020-08-03 RX ADMIN — DEXTROSE MONOHYDRATE SCH MLS/HR: 25 INJECTION, SOLUTION INTRAVENOUS at 14:29

## 2020-08-03 RX ADMIN — HYDROCORTISONE SODIUM SUCCINATE SCH MG: 100 INJECTION, POWDER, FOR SOLUTION INTRAMUSCULAR; INTRAVENOUS at 16:26

## 2020-08-03 RX ADMIN — SODIUM CHLORIDE SCH MLS/HR: 9 INJECTION INTRAMUSCULAR; INTRAVENOUS; SUBCUTANEOUS at 16:25

## 2020-08-03 NOTE — NUR
Reassessment: Pt re-intubated. Per nursing notes had critically low 

Hgb/Hct , had bleeding from TDC. Sodium low, K is high, phos is high, had 

very low blood glucose down to 18 now receiving IV 20% dextrose/NS. MAP is 

55, receiving norepinephrine. Will follow and monitor needs for TF on vent.



Recommend:

1. IF tube feeding recommend using nepro at 55 ml/hr will provide 1320 ml 

volume, 2376 cals, 107 gram protein, 960 ml free water. 



2. IF tube feeding additional water flush per intensivist; on HD



3. IF tube feeding PALB Q M/Th; Daily weights



4. routine bowel care, prokinetic and opiod agonist antagonist

-------------------------------------------------------------------------------

Addendum: 08/03/20 at 1119 by Sherita Shirley RD

-------------------------------------------------------------------------------

Amended: Links added.

## 2020-08-03 NOTE — NUR
[id number] Received report from Maria Isabel, [facility], patient appears to have deteriorated, 
garbled talk nonsensical, was able to discern he was in pain. Started receiving critical 
labs. addressed BGL immediately. Pt breathing shallow and over 40, eyes jaudiced. Right eye 
deviates to the right (per other nurses this is not new). wide area of discoloration to 
abdomen, large shaped U area that is pale in the middle and red along the margins, pinkness 
extends down legs. Terribly edemic and weeping. DC'd PIV from left AC that is infiltrated. 
Large loose bloody stool incontinent. 0900 MD here to intubate. ET tube size 8 and 25 at the 
teeth placed. FFP infused by nursing, 2 units PRBC by dialysis. OG placed. MD requested that 
GI leave the [place] [employer] sum in when EGD done. patient continues to overbreath the vent, stacks 
breath until vent reads apneic order received to give ativan in addition to precedex, fent 
and versed. Dialysis completed appos 1230 unable to pull fluid was 500 cc positive after 
dialysis. EGD performed at same time as dialysis, no bleed in upper scope noted. Continue to 
esteban blood pressure even with addition of levophed and vasopressin. [id number] BGL 
continues to be low, received multiple D50 pushes as well as D20 IV gtt that was increased 
to D50 IV gtt. Patient code status changed to no compression no defibrillation after MD 
spoke with wife via phone. 1400 Nursing spoke with wife re patient status and it was 
arranged for her to visit at bedside for 15 minutes, MD spoke with family at bedside when 
they arrived. BP remains very labile and pressor dependent. See multiple lab draw results 
for criticals and changes, see MAR for med changes. 1830Reported off to Abundio Rangel RN.

## 2020-08-03 NOTE — NUR
Patient in room CICU 2013. I have received report from  Maria Isabel

 and had the opportunity to ask questions and assume patient care.

## 2020-08-03 NOTE — NUR
hemodynamically unstable

-------------------------------------------------------------------------------

Addendum: 08/03/20 at 2244 by Misty Rangel RN

-------------------------------------------------------------------------------

Amended: Links added.

## 2020-08-03 NOTE — NUR
Problems reprioritized. Patient report given, questions answered & plan of care reviewed 
with Ena MAGAÑA.

## 2020-08-03 NOTE — NUR
Ventilator still triggering alarm:"patient circuit occluded" and not ventilating patient 
after change out.  RT at bedside, able to resume ventilating. Monitoring closely, will 
change out ventilator if necessary.  Tachypnea decreased with increasing sedation, RR now 
26.  Titrating pressors to maintain MAP greater than 65.

## 2020-08-03 NOTE — NUR
1830:Patient in room CICU 2013. I have received report from Ena MAGAÑA    and had the 
opportunity to ask questions and assume patient care.  Patient tachypneic, tachycardic, 
febrile. Awaiting 1800 lab results.

## 2020-08-04 VITALS — SYSTOLIC BLOOD PRESSURE: 119 MMHG | DIASTOLIC BLOOD PRESSURE: 52 MMHG

## 2020-08-04 VITALS — DIASTOLIC BLOOD PRESSURE: 53 MMHG | SYSTOLIC BLOOD PRESSURE: 137 MMHG

## 2020-08-04 VITALS — SYSTOLIC BLOOD PRESSURE: 112 MMHG | DIASTOLIC BLOOD PRESSURE: 44 MMHG

## 2020-08-04 VITALS — SYSTOLIC BLOOD PRESSURE: 111 MMHG | DIASTOLIC BLOOD PRESSURE: 43 MMHG

## 2020-08-04 VITALS — DIASTOLIC BLOOD PRESSURE: 49 MMHG | SYSTOLIC BLOOD PRESSURE: 110 MMHG

## 2020-08-04 VITALS — DIASTOLIC BLOOD PRESSURE: 38 MMHG | SYSTOLIC BLOOD PRESSURE: 114 MMHG

## 2020-08-04 VITALS — DIASTOLIC BLOOD PRESSURE: 52 MMHG | SYSTOLIC BLOOD PRESSURE: 120 MMHG

## 2020-08-04 VITALS — SYSTOLIC BLOOD PRESSURE: 106 MMHG | DIASTOLIC BLOOD PRESSURE: 39 MMHG

## 2020-08-04 VITALS — DIASTOLIC BLOOD PRESSURE: 45 MMHG | SYSTOLIC BLOOD PRESSURE: 106 MMHG

## 2020-08-04 VITALS — SYSTOLIC BLOOD PRESSURE: 100 MMHG | DIASTOLIC BLOOD PRESSURE: 37 MMHG

## 2020-08-04 VITALS — DIASTOLIC BLOOD PRESSURE: 42 MMHG | SYSTOLIC BLOOD PRESSURE: 113 MMHG

## 2020-08-04 VITALS — SYSTOLIC BLOOD PRESSURE: 103 MMHG | DIASTOLIC BLOOD PRESSURE: 39 MMHG

## 2020-08-04 VITALS — SYSTOLIC BLOOD PRESSURE: 99 MMHG | DIASTOLIC BLOOD PRESSURE: 38 MMHG

## 2020-08-04 VITALS — DIASTOLIC BLOOD PRESSURE: 33 MMHG | SYSTOLIC BLOOD PRESSURE: 88 MMHG

## 2020-08-04 VITALS — DIASTOLIC BLOOD PRESSURE: 44 MMHG | SYSTOLIC BLOOD PRESSURE: 105 MMHG

## 2020-08-04 VITALS — DIASTOLIC BLOOD PRESSURE: 36 MMHG | SYSTOLIC BLOOD PRESSURE: 110 MMHG

## 2020-08-04 VITALS — SYSTOLIC BLOOD PRESSURE: 108 MMHG | DIASTOLIC BLOOD PRESSURE: 50 MMHG

## 2020-08-04 VITALS — SYSTOLIC BLOOD PRESSURE: 94 MMHG | DIASTOLIC BLOOD PRESSURE: 36 MMHG

## 2020-08-04 LAB
ALBUMIN SERPL BCP-MCNC: 0.9 G/DL (ref 3.4–5)
ALBUMIN/GLOB SERPL: 0.5 {RATIO} (ref 1.1–1.5)
ALP SERPL-CCNC: 117 IU/L (ref 46–116)
ALT SERPL W P-5'-P-CCNC: 68 U/L (ref 12–78)
ANION GAP SERPL CALCULATED.3IONS-SCNC: 24 MMOL/L (ref 8–16)
ANISOCYTOSIS BLD QL SMEAR: (no result)
APTT PPP: 115 SECONDS (ref 22–32)
AST SERPL W P-5'-P-CCNC: 107 U/L (ref 10–37)
BASE EXCESS BLDA CALC-SCNC: -19.1 MMOL/L (ref -2–2)
BASOPHILS # BLD AUTO: (no result) X10'3 (ref 0–0.2)
BASOPHILS NFR BLD AUTO: (no result) % (ref 0–1)
BILIRUB SERPL-MCNC: 2 MG/DL (ref 0.1–1)
BODY TEMPERATURE: 37.5
BUN SERPL-MCNC: 62 MG/DL (ref 7–18)
BUN/CREAT SERPL: 11.7 (ref 5.4–32)
CALCIUM SERPL-MCNC: 6.9 MG/DL (ref 8.5–10.1)
CHLORIDE SERPL-SCNC: 100 MMOL/L (ref 99–107)
CK SERPL-CCNC: 450 U/L (ref 39–308)
CO2 SERPL-SCNC: 9.9 MMOL/L (ref 24–32)
COHGB MFR BLDA: 0.3 % (ref 0–3.9)
CREAT SERPL-MCNC: 5.31 MG/DL (ref 0.6–1.1)
EOSINOPHIL # BLD AUTO: (no result) X10'3 (ref 0–0.9)
EOSINOPHIL NFR BLD AUTO: (no result) % (ref 0–6)
ERYTHROCYTE [DISTWIDTH] IN BLOOD BY AUTOMATED COUNT: 17.4 % (ref 11.5–14.5)
ERYTHROCYTE [DISTWIDTH] IN BLOOD BY AUTOMATED COUNT: 17.6 % (ref 11.5–14.5)
GFR SERPL CREATININE-BSD FRML MDRD: 12 ML/MIN
GLUCOSE SERPL-MCNC: 85 MG/DL (ref 70–104)
HCO3 BLDA-SCNC: 8.9 MMOL/L (ref 22–26)
HCT VFR BLD AUTO: 22.4 % (ref 42–52)
HCT VFR BLD AUTO: 23.8 % (ref 42–52)
HGB BLD-MCNC: 7.6 G/DL (ref 14–17.9)
HGB BLD-MCNC: 7.9 G/DL (ref 14–17.9)
HGB BLDA-MCNC: 8.5 G/DL (ref 14–18)
LYMPHOCYTES # BLD AUTO: (no result) X10'3 (ref 1.1–4.8)
LYMPHOCYTES NFR BLD AUTO: (no result) % (ref 21–51)
LYMPHOCYTES NFR BLD MANUAL: 6 % (ref 21–51)
MAGNESIUM SERPL-MCNC: 1.7 MG/DL (ref 1.5–2.4)
MCH RBC QN AUTO: 32.1 PG (ref 27–31)
MCH RBC QN AUTO: 33.3 PG (ref 27–31)
MCHC RBC AUTO-ENTMCNC: 33 G/DL (ref 33–36.5)
MCHC RBC AUTO-ENTMCNC: 33.8 G/DL (ref 33–36.5)
MCV RBC AUTO: 97.3 FL (ref 78–98)
MCV RBC AUTO: 98.5 FL (ref 78–98)
METAMYELOCYTES NFR BLD MANUAL: 1 % (ref 0–0)
METHGB MFR BLDA: 0.6 % (ref 0–1.5)
MONOCYTES # BLD AUTO: (no result) X10'3 (ref 0–0.9)
MONOCYTES NFR BLD AUTO: (no result) % (ref 2–12)
MONOCYTES NFR BLD MANUAL: 8 % (ref 2–12)
NEUTROPHILS # BLD AUTO: (no result) X10'3 (ref 1.8–7.7)
NEUTROPHILS NFR BLD AUTO: (no result) % (ref 42–75)
NEUTS BAND # BLD MANUAL: 49 % (ref 0–10)
NEUTS SEG NFR BLD MANUAL: 36 % (ref 42–75)
NEUTS VAC BLD QL SMEAR: (no result)
NRBC BLD MANUAL-RTO: 2 /100WBC (ref 0–0)
O2/TOTAL GAS SETTING VFR VENT: 80 MMHG/%
OXYHGB MFR BLDA: 94 % (ref 94–97)
PCO2 TEMP ADJ BLDA: 29.3 MMHG (ref 35–48)
PEEP RESPIRATORY: 5 CM H2O
PHOSPHATE SERPL-MCNC: 8.8 MG/DL (ref 2.3–4.5)
PLATELET # BLD AUTO: 5 X10'3 (ref 140–440)
PLATELET # BLD AUTO: 9 X10'3 (ref 140–440)
PLATELET BLD QL SMEAR: (no result)
PMV BLD AUTO: 8.2 FL (ref 7.4–10.4)
PMV BLD AUTO: 9.6 FL (ref 7.4–10.4)
PO2 TEMP ADJ BLD: 99.1 MMHG (ref 75–100)
POTASSIUM SERPL-SCNC: 4.9 MMOL/L (ref 3.5–5.1)
PROT SERPL-MCNC: 2.9 G/DL (ref 6.4–8.2)
RBC # BLD AUTO: 2.27 X10'6 (ref 4.7–6.1)
RBC # BLD AUTO: 2.45 X10'6 (ref 4.7–6.1)
RBC MORPH BLD: (no result)
RESP RATE 1H: 20 B/MIN
SAO2 % BLDA: 94.9 % (ref 94–97)
SODIUM SERPL-SCNC: 134 MMOL/L (ref 135–145)
SPONT VT COMPRES GAS VOL SET UNCOR VENT: 550 ML
TOTAL CELLS COUNTED FLD: 100
VANCOMYCIN SERPL-MCNC: 8.9 UG/ML
WBC # BLD AUTO: 10 X10'3 (ref 4.5–11)
WBC # BLD AUTO: 5.4 X10'3 (ref 4.5–11)

## 2020-08-04 PROCEDURE — 30233R1 TRANSFUSION OF NONAUTOLOGOUS PLATELETS INTO PERIPHERAL VEIN, PERCUTANEOUS APPROACH: ICD-10-PCS | Performed by: INTERNAL MEDICINE

## 2020-08-04 RX ADMIN — PANTOPRAZOLE SODIUM SCH MLS/HR: 40 INJECTION, POWDER, FOR SOLUTION INTRAVENOUS at 01:00

## 2020-08-04 RX ADMIN — PANTOPRAZOLE SODIUM SCH MLS/HR: 40 INJECTION, POWDER, FOR SOLUTION INTRAVENOUS at 06:00

## 2020-08-04 RX ADMIN — PANTOPRAZOLE SODIUM SCH MLS/HR: 40 INJECTION, POWDER, FOR SOLUTION INTRAVENOUS at 08:47

## 2020-08-04 RX ADMIN — HYDROCORTISONE SODIUM SUCCINATE SCH MG: 100 INJECTION, POWDER, FOR SOLUTION INTRAMUSCULAR; INTRAVENOUS at 01:18

## 2020-08-04 RX ADMIN — DEXMEDETOMIDINE HYDROCHLORIDE SCH MLS/HR: 100 INJECTION, SOLUTION INTRAVENOUS at 03:23

## 2020-08-04 RX ADMIN — Medication PRN MLS/HR: at 03:22

## 2020-08-04 RX ADMIN — Medication PRN MLS/HR: at 09:17

## 2020-08-04 RX ADMIN — SODIUM CHLORIDE SCH MLS/HR: 9 INJECTION INTRAMUSCULAR; INTRAVENOUS; SUBCUTANEOUS at 07:10

## 2020-08-04 RX ADMIN — DEXTROSE MONOHYDRATE SCH MLS/HR: 25 INJECTION, SOLUTION INTRAVENOUS at 08:31

## 2020-08-04 RX ADMIN — DEXTROSE MONOHYDRATE SCH MLS/HR: 25 INJECTION, SOLUTION INTRAVENOUS at 02:03

## 2020-08-04 RX ADMIN — SODIUM CHLORIDE SCH MLS/HR: 9 INJECTION INTRAMUSCULAR; INTRAVENOUS; SUBCUTANEOUS at 01:18

## 2020-08-04 RX ADMIN — ALBUTEROL SULFATE SCH MG: 2.5 SOLUTION RESPIRATORY (INHALATION) at 03:06

## 2020-08-04 RX ADMIN — Medication PRN MLS/HR: at 06:43

## 2020-08-04 RX ADMIN — Medication PRN MLS/HR: at 12:15

## 2020-08-04 RX ADMIN — Medication PRN MLS/HR: at 03:23

## 2020-08-04 RX ADMIN — SODIUM CHLORIDE SCH MLS/HR: 9 INJECTION INTRAMUSCULAR; INTRAVENOUS; SUBCUTANEOUS at 05:18

## 2020-08-04 RX ADMIN — SODIUM CHLORIDE SCH MLS/HR: 9 INJECTION INTRAMUSCULAR; INTRAVENOUS; SUBCUTANEOUS at 01:50

## 2020-08-04 RX ADMIN — ALBUTEROL SULFATE SCH MG: 2.5 SOLUTION RESPIRATORY (INHALATION) at 11:17

## 2020-08-04 RX ADMIN — ALBUTEROL SULFATE SCH MG: 2.5 SOLUTION RESPIRATORY (INHALATION) at 06:57

## 2020-08-04 RX ADMIN — HYDROCORTISONE SODIUM SUCCINATE SCH MG: 100 INJECTION, POWDER, FOR SOLUTION INTRAMUSCULAR; INTRAVENOUS at 07:10

## 2020-08-04 RX ADMIN — PANTOPRAZOLE SODIUM SCH MLS/HR: 40 INJECTION, POWDER, FOR SOLUTION INTRAVENOUS at 03:23

## 2020-08-04 RX ADMIN — Medication PRN MLS/HR: at 09:18

## 2020-08-04 RX ADMIN — Medication PRN MLS/HR: at 00:26

## 2020-08-04 NOTE — NUR
Called Kerry (wife) to update her to patient's declining condition. Per nursing 
supervisor, OK if patient's wife comes in with her daughter. Per Kerry, they're trying to 
arrange  so they can make it to patient's bedside.

## 2020-08-04 NOTE — NUR
Tachypnea decreased.  No further issues with ventilator. Titrating pressors to maintain MAP 
greater than 65.  Reddened areas on anterior abdomen noted at start of shift no longer 
visible.  Dressing changed on tunneled cath, no signs of bleeding, replaced with new 
pressure dressing.  No longer febrile.

## 2020-08-04 NOTE — NUR
Patient released to [facility] in [place]. Patient's belongings were released 
to family earlier today. No belongings left at bedside.

## 2020-08-04 NOTE — NUR
Dr. Keen spoke to Kerry (wife) over phone and explained situation regarding mottling 
to bilateral legs, levophed demand increasing as well as overall prognosis. Kerry 
verbalized understanding and stated that she was going to come in with her daughter soon to 
make final decisions.

## 2020-08-04 NOTE — NUR
1200 blood glucose was 47; reinitiated D50 gtt at 50ml/hr and bolused 50mL off of the IV 
pump to correct initial low BG. Will recheck at 1225 per protocol.

## 2020-08-04 NOTE — NUR
Problems reprioritized. Patient report given, questions answered & plan of care reviewed 
with Irene MAGAÑA. .

## 2020-08-04 NOTE — NUR
Patient in room CICU 2013. I have received report from  Misty MAGAÑA  and had the opportunity 
to ask questions and assume patient care.

## 2024-02-24 NOTE — NUR
Problems reprioritized. Patient report given, questions answered & plan of care reviewed 
with Bhavik MAGAÑA. 24-Feb-2024 03:51